# Patient Record
Sex: FEMALE | Race: OTHER | HISPANIC OR LATINO
[De-identification: names, ages, dates, MRNs, and addresses within clinical notes are randomized per-mention and may not be internally consistent; named-entity substitution may affect disease eponyms.]

---

## 2018-10-24 PROBLEM — Z00.00 ENCOUNTER FOR PREVENTIVE HEALTH EXAMINATION: Status: ACTIVE | Noted: 2018-10-24

## 2018-11-05 ENCOUNTER — APPOINTMENT (OUTPATIENT)
Dept: UROLOGY | Facility: CLINIC | Age: 48
End: 2018-11-05

## 2018-12-10 ENCOUNTER — APPOINTMENT (OUTPATIENT)
Dept: UROLOGY | Facility: CLINIC | Age: 48
End: 2018-12-10
Payer: MEDICAID

## 2018-12-10 VITALS
DIASTOLIC BLOOD PRESSURE: 86 MMHG | SYSTOLIC BLOOD PRESSURE: 135 MMHG | HEIGHT: 65 IN | BODY MASS INDEX: 21.99 KG/M2 | HEART RATE: 72 BPM | WEIGHT: 132 LBS

## 2018-12-10 LAB
BILIRUB UR QL STRIP: NORMAL
CLARITY UR: CLEAR
COLLECTION METHOD: NORMAL
GLUCOSE UR-MCNC: NORMAL
HCG UR QL: 0.2 EU/DL
HGB UR QL STRIP.AUTO: NORMAL
KETONES UR-MCNC: NORMAL
LEUKOCYTE ESTERASE UR QL STRIP: NORMAL
NITRITE UR QL STRIP: NORMAL
PH UR STRIP: 5
PROT UR STRIP-MCNC: NORMAL
SP GR UR STRIP: <=1.005

## 2018-12-10 PROCEDURE — 81003 URINALYSIS AUTO W/O SCOPE: CPT | Mod: QW

## 2018-12-10 PROCEDURE — 99204 OFFICE O/P NEW MOD 45 MIN: CPT | Mod: 25

## 2018-12-11 LAB
APPEARANCE: CLEAR
BACTERIA: ABNORMAL
BILIRUBIN URINE: NEGATIVE
BLOOD URINE: ABNORMAL
COLOR: YELLOW
GLUCOSE QUALITATIVE U: NEGATIVE MG/DL
HYALINE CASTS: 1 /LPF
KETONES URINE: NEGATIVE
LEUKOCYTE ESTERASE URINE: NEGATIVE
MICROSCOPIC-UA: NORMAL
NITRITE URINE: NEGATIVE
PH URINE: 5.5
PROTEIN URINE: NEGATIVE MG/DL
RED BLOOD CELLS URINE: 0 /HPF
SPECIFIC GRAVITY URINE: 1.01
SQUAMOUS EPITHELIAL CELLS: 2 /HPF
UROBILINOGEN URINE: NEGATIVE MG/DL
WHITE BLOOD CELLS URINE: 1 /HPF

## 2018-12-13 LAB — BACTERIA UR CULT: ABNORMAL

## 2018-12-17 ENCOUNTER — FORM ENCOUNTER (OUTPATIENT)
Age: 48
End: 2018-12-17

## 2018-12-18 ENCOUNTER — OUTPATIENT (OUTPATIENT)
Dept: OUTPATIENT SERVICES | Facility: HOSPITAL | Age: 48
LOS: 1 days | End: 2018-12-18
Payer: COMMERCIAL

## 2018-12-18 ENCOUNTER — APPOINTMENT (OUTPATIENT)
Dept: ULTRASOUND IMAGING | Facility: CLINIC | Age: 48
End: 2018-12-18
Payer: MEDICAID

## 2018-12-18 DIAGNOSIS — N12 TUBULO-INTERSTITIAL NEPHRITIS, NOT SPECIFIED AS ACUTE OR CHRONIC: ICD-10-CM

## 2018-12-18 PROCEDURE — 76770 US EXAM ABDO BACK WALL COMP: CPT

## 2018-12-18 PROCEDURE — 76770 US EXAM ABDO BACK WALL COMP: CPT | Mod: 26

## 2019-01-14 ENCOUNTER — APPOINTMENT (OUTPATIENT)
Dept: UROLOGY | Facility: CLINIC | Age: 49
End: 2019-01-14
Payer: MEDICAID

## 2019-01-14 VITALS
SYSTOLIC BLOOD PRESSURE: 132 MMHG | OXYGEN SATURATION: 99 % | DIASTOLIC BLOOD PRESSURE: 81 MMHG | HEIGHT: 65 IN | BODY MASS INDEX: 22.49 KG/M2 | WEIGHT: 135 LBS | HEART RATE: 69 BPM

## 2019-01-14 DIAGNOSIS — R30.0 DYSURIA: ICD-10-CM

## 2019-01-14 LAB
BILIRUB UR QL STRIP: NORMAL
CLARITY UR: CLEAR
COLLECTION METHOD: NORMAL
GLUCOSE UR-MCNC: NORMAL
HCG UR QL: 0.2 EU/DL
HGB UR QL STRIP.AUTO: NORMAL
KETONES UR-MCNC: NORMAL
LEUKOCYTE ESTERASE UR QL STRIP: NORMAL
NITRITE UR QL STRIP: NORMAL
PH UR STRIP: 5.5
PROT UR STRIP-MCNC: NORMAL
SP GR UR STRIP: 1.01

## 2019-01-14 PROCEDURE — 99213 OFFICE O/P EST LOW 20 MIN: CPT | Mod: 25

## 2019-01-14 PROCEDURE — 81003 URINALYSIS AUTO W/O SCOPE: CPT | Mod: QW

## 2019-01-14 NOTE — HISTORY OF PRESENT ILLNESS
[FreeTextEntry1] : 48 year old woman seen 12/10/2018 with complaint of recent episode of pyelonephritis. She was seen and treated at [facility], but she does not have any medical records with her today. This began 1 month ago. She reports continued dysuria and SP pain. \par \par 1/14/19: Patient presents for follow up. She reports her symptoms improved with abx, but returned after completion. She had been started on cipro, but UCx showed ESBL E coli resistent to quinolones. She was switched to macrobid. She reports continued frequency, urgency, dysuria, and bilateral flank pain. \par No hematuria, no hesitancy, no straining. No incontinence. \par No fevers, no chills, no nausea, no vomiting. \par \par No family history contributory to pyelonephritis.\par

## 2019-01-14 NOTE — ASSESSMENT
[FreeTextEntry1] : 49 yo F with recent pyelonephritis, s/p treatment for ESBL e coli. We discussed her sx recurrence may be return of UTI, or may be residual symptoms due to  tract irritation. Will repeat UCx and start Cefuroxime based on sensitivities. Pt aware that culture may show her only options for tx are IV abx.

## 2019-01-14 NOTE — REASON FOR VISIT
[[employer] ] : provided by [employer]   [Follow-up Visit ___] : a follow-up visit  for [unfilled] [FreeTextEntry1] : [id number] [FreeTextEntry2] : Herberto

## 2019-01-23 LAB
APPEARANCE: CLEAR
BACTERIA UR CULT: ABNORMAL
BACTERIA: NEGATIVE
BILIRUBIN URINE: NEGATIVE
BLOOD URINE: NEGATIVE
COLOR: YELLOW
GLUCOSE QUALITATIVE U: NEGATIVE MG/DL
HYALINE CASTS: 1 /LPF
KETONES URINE: NEGATIVE
LEUKOCYTE ESTERASE URINE: NEGATIVE
MICROSCOPIC-UA: NORMAL
NITRITE URINE: NEGATIVE
PH URINE: 5
PROTEIN URINE: NEGATIVE MG/DL
RED BLOOD CELLS URINE: 0 /HPF
SPECIFIC GRAVITY URINE: 1.01
SQUAMOUS EPITHELIAL CELLS: 2 /HPF
UROBILINOGEN URINE: NEGATIVE MG/DL
WHITE BLOOD CELLS URINE: 1 /HPF

## 2019-01-28 ENCOUNTER — APPOINTMENT (OUTPATIENT)
Age: 49
End: 2019-01-28
Payer: MEDICAID

## 2019-01-28 DIAGNOSIS — A49.9 URINARY TRACT INFECTION, SITE NOT SPECIFIED: ICD-10-CM

## 2019-01-28 DIAGNOSIS — N12 TUBULO-INTERSTITIAL NEPHRITIS, NOT SPECIFIED AS ACUTE OR CHRONIC: ICD-10-CM

## 2019-01-28 DIAGNOSIS — N39.0 URINARY TRACT INFECTION, SITE NOT SPECIFIED: ICD-10-CM

## 2019-01-28 PROCEDURE — 99213 OFFICE O/P EST LOW 20 MIN: CPT

## 2019-01-28 NOTE — ASSESSMENT
[FreeTextEntry1] : 49 yo F with episode of pyelonephritis and persistent UTI symptoms afterward, now with resolution on cefuroxime. Will repeat UCx for test of cure. Renal US unremarkable. This so far is an isolated episode and therefore pt does not need further work up at this time. If it recurs, pt can return for further evaluation.

## 2019-01-28 NOTE — HISTORY OF PRESENT ILLNESS
[FreeTextEntry1] : 48 year old woman seen 12/10/2018 with complaint of recent episode of pyelonephritis. She was seen and treated at [facility], but she does not have any medical records with her today. This began 1 month ago. She reports continued dysuria and SP pain. \par \par 1/14/19: Patient presents for follow up. She reports her symptoms improved with abx, but returned after completion. She had been started on cipro, but UCx showed ESBL E coli resistent to quinolones. She was switched to macrobid. She reports continued frequency, urgency, dysuria, and bilateral flank pain. \par No hematuria, no hesitancy, no straining. No incontinence. \par No fevers, no chills, no nausea, no vomiting. \par \par 1/28/19: Patient presents for follow up. Ucx sent at time of last visit grew E Coli, but sensitive to all but quinolones. She reports near resolution of her symptoms since changing abx to Cefuroime, which she has completed. Frequency, urgency, dysuria have resolved. She still reports mild bilateral flank pain, but only occasionally.  No hematuria, no hesitancy, no straining. No incontinence. \par No fevers, no chills, no nausea, no vomiting. \par \par Renal US (12/18/18): no hydronephrosis, no renal stones. 4 mm echogenic focus c/w angiomyolipoma

## 2019-01-28 NOTE — REASON FOR VISIT
[[employer] ] : provided by [employer]   [Follow-up Visit ___] : a follow-up visit  for [unfilled] [FreeTextEntry1] : [id number] [FreeTextEntry2] : Francy

## 2019-01-29 LAB
APPEARANCE: CLEAR
BACTERIA: NEGATIVE
BILIRUBIN URINE: NEGATIVE
BLOOD URINE: NEGATIVE
COLOR: YELLOW
GLUCOSE QUALITATIVE U: NEGATIVE MG/DL
HYALINE CASTS: 1 /LPF
KETONES URINE: NEGATIVE
LEUKOCYTE ESTERASE URINE: NEGATIVE
MICROSCOPIC-UA: NORMAL
NITRITE URINE: NEGATIVE
PH URINE: 5
PROTEIN URINE: NEGATIVE MG/DL
RED BLOOD CELLS URINE: 1 /HPF
SPECIFIC GRAVITY URINE: 1.02
SQUAMOUS EPITHELIAL CELLS: 2 /HPF
UROBILINOGEN URINE: NEGATIVE MG/DL
WHITE BLOOD CELLS URINE: 1 /HPF

## 2019-01-30 LAB — BACTERIA UR CULT: NORMAL

## 2020-12-17 ENCOUNTER — APPOINTMENT (OUTPATIENT)
Dept: RHEUMATOLOGY | Facility: CLINIC | Age: 50
End: 2020-12-17

## 2020-12-21 PROBLEM — N39.0 URINARY TRACT INFECTION, BACTERIAL: Status: RESOLVED | Noted: 2018-12-13 | Resolved: 2020-12-21

## 2022-02-16 ENCOUNTER — APPOINTMENT (OUTPATIENT)
Dept: RHEUMATOLOGY | Facility: CLINIC | Age: 52
End: 2022-02-16
Payer: COMMERCIAL

## 2022-02-16 ENCOUNTER — RESULT REVIEW (OUTPATIENT)
Age: 52
End: 2022-02-16

## 2022-02-16 VITALS
OXYGEN SATURATION: 99 % | HEART RATE: 99 BPM | WEIGHT: 139 LBS | BODY MASS INDEX: 23.16 KG/M2 | SYSTOLIC BLOOD PRESSURE: 157 MMHG | DIASTOLIC BLOOD PRESSURE: 69 MMHG | HEIGHT: 65 IN | TEMPERATURE: 97.2 F

## 2022-02-16 PROCEDURE — 99204 OFFICE O/P NEW MOD 45 MIN: CPT | Mod: 25

## 2022-02-16 NOTE — HISTORY OF PRESENT ILLNESS
[FreeTextEntry1] : 52 y/o female referred to rheumatology for bone pain\par Pt started to have worsening pain the neck and arm x 1 year. Pt reports pains the R>L arm and b/l feet as well.\par Denies joint swelling/redness. AM stiffness lasting ~2 mins. Pains worse with rest. Pt has not had X-rays recently.\par Pt takes Tylenol and ibuprofen PRN for the pains with mild improvement but bothers her stomach.\par Numbness/tingling of hands.\par Pt was seen by orthopedics about 3 years and told that she had some arthritis of the neck.\par Pt had PT which did not help the patient.\par \par Patient denies fatigue, fever, chills, chest pain, abdominal pain, cough, SOB, nausea, vomiting, diarrhea,  blood in stool, hematuria, rash, Raynaud's, alopecia, dry eyes, dry mouth, miscarriages, Hx of DVT/PEs.\par ROS negative unless otherwise noted above.\par \par No family Hx of autoimmune diseases.\par \par

## 2022-02-16 NOTE — ASSESSMENT
[FreeTextEntry1] : 52 y/o female referred to rheumatology for bone pain\par Pt started to have worsening pain the neck and arm x 1 year. Pt reports pains the R>L arm and b/l feet as well.\par Denies joint swelling/redness. AM stiffness lasting ~2 mins. Pains worse with rest. Pt has not had X-rays recently.\par Pt takes Tylenol and ibuprofen PRN for the pains with mild improvement but bothers her stomach.\par Numbness/tingling of hands.\par Pt was seen by orthopedics about 3 years and told that she had some arthritis of the neck.\par Pt had PT which did not help the patient.\par No family Hx of autoimmune diseases.\par \par Patient has pains of neck, R>L arm and feet that are non-inflammatory in nature without signs of synovitis. Exam shows tight paraspinal muscles near cervical spine. Patient likely has pains from osteoarthritis, compressive neuropathy of cervical spine, and muscle spasm of paraspinal muscles.\par No signs or symptoms concerning for underlying autoimmune disease\par \par - Prescribed naproxen 500mg PO BID PRN pain. I advisedthat the NSAID should be taken with food.  In addition while taking the prescribed NSAID, no over the counter or other NSAIDs should be used, such as ibuprofen (Motrin or Advil) or naproxen (Aleve) as this can cause stomach upset or other side effects.  If needed forbreakthrough pain Tylenol can be used.\par - Obtain labwork to screen for signs of autoimmune inflammatory arthritis\par - Obtain XR of C-spine, R shoulder, b/l feet to evaluate for arthritis\par - Will consider advanced imaging of C-spine, orthopedics, neuro referral based on above results\par - RTC 1 month for follow up\par

## 2022-02-17 LAB
ALBUMIN SERPL ELPH-MCNC: 4.7 G/DL
ALP BLD-CCNC: 107 U/L
ALT SERPL-CCNC: 17 U/L
ANION GAP SERPL CALC-SCNC: 13 MMOL/L
AST SERPL-CCNC: 21 U/L
BASOPHILS # BLD AUTO: 0.02 K/UL
BASOPHILS NFR BLD AUTO: 0.6 %
BILIRUB SERPL-MCNC: 0.3 MG/DL
BUN SERPL-MCNC: 10 MG/DL
CALCIUM SERPL-MCNC: 10 MG/DL
CHLORIDE SERPL-SCNC: 105 MMOL/L
CO2 SERPL-SCNC: 25 MMOL/L
CREAT SERPL-MCNC: 0.68 MG/DL
CRP SERPL-MCNC: <3 MG/L
EOSINOPHIL # BLD AUTO: 0.04 K/UL
EOSINOPHIL NFR BLD AUTO: 1.2 %
ERYTHROCYTE [SEDIMENTATION RATE] IN BLOOD BY WESTERGREN METHOD: 24 MM/HR
GLUCOSE SERPL-MCNC: 122 MG/DL
HCT VFR BLD CALC: 40.2 %
HGB BLD-MCNC: 13.1 G/DL
IMM GRANULOCYTES NFR BLD AUTO: 0 %
LYMPHOCYTES # BLD AUTO: 1.4 K/UL
LYMPHOCYTES NFR BLD AUTO: 41.8 %
MAN DIFF?: NORMAL
MCHC RBC-ENTMCNC: 30.5 PG
MCHC RBC-ENTMCNC: 32.6 GM/DL
MCV RBC AUTO: 93.5 FL
MONOCYTES # BLD AUTO: 0.24 K/UL
MONOCYTES NFR BLD AUTO: 7.2 %
NEUTROPHILS # BLD AUTO: 1.65 K/UL
NEUTROPHILS NFR BLD AUTO: 49.2 %
PLATELET # BLD AUTO: 208 K/UL
POTASSIUM SERPL-SCNC: 3.6 MMOL/L
PROT SERPL-MCNC: 7.3 G/DL
RBC # BLD: 4.3 M/UL
RBC # FLD: 13.9 %
RHEUMATOID FACT SER QL: <10 IU/ML
SODIUM SERPL-SCNC: 144 MMOL/L
WBC # FLD AUTO: 3.35 K/UL

## 2022-02-19 LAB
ANA PAT FLD IF-IMP: ABNORMAL
ANA SER IF-ACNC: ABNORMAL
CCP AB SER IA-ACNC: <8 UNITS
RF+CCP IGG SER-IMP: NEGATIVE

## 2022-02-26 ENCOUNTER — APPOINTMENT (OUTPATIENT)
Dept: RADIOLOGY | Facility: CLINIC | Age: 52
End: 2022-02-26
Payer: COMMERCIAL

## 2022-02-26 ENCOUNTER — OUTPATIENT (OUTPATIENT)
Dept: OUTPATIENT SERVICES | Facility: HOSPITAL | Age: 52
LOS: 1 days | End: 2022-02-26
Payer: COMMERCIAL

## 2022-02-26 DIAGNOSIS — M54.2 CERVICALGIA: ICD-10-CM

## 2022-02-26 PROCEDURE — 73030 X-RAY EXAM OF SHOULDER: CPT | Mod: 26,RT

## 2022-02-26 PROCEDURE — 73620 X-RAY EXAM OF FOOT: CPT

## 2022-02-26 PROCEDURE — 72040 X-RAY EXAM NECK SPINE 2-3 VW: CPT | Mod: 26

## 2022-02-26 PROCEDURE — 73030 X-RAY EXAM OF SHOULDER: CPT

## 2022-02-26 PROCEDURE — 73620 X-RAY EXAM OF FOOT: CPT | Mod: 26,LT,RT

## 2022-02-26 PROCEDURE — 72040 X-RAY EXAM NECK SPINE 2-3 VW: CPT

## 2022-03-16 ENCOUNTER — APPOINTMENT (OUTPATIENT)
Dept: RHEUMATOLOGY | Facility: CLINIC | Age: 52
End: 2022-03-16
Payer: COMMERCIAL

## 2022-03-16 VITALS
WEIGHT: 141 LBS | HEART RATE: 83 BPM | SYSTOLIC BLOOD PRESSURE: 149 MMHG | OXYGEN SATURATION: 98 % | DIASTOLIC BLOOD PRESSURE: 90 MMHG | BODY MASS INDEX: 23.49 KG/M2 | HEIGHT: 65 IN | TEMPERATURE: 97.1 F

## 2022-03-16 PROCEDURE — 99214 OFFICE O/P EST MOD 30 MIN: CPT | Mod: 25

## 2022-03-16 NOTE — HISTORY OF PRESENT ILLNESS
[FreeTextEntry1] : HISTORY: \par 52 y/o female presents as follow up for bone pain \par Pt started to have worsening pain the neck and arm x 1 year. Pt reports pains the R>L arm and b/l feet as well. \par Denies joint swelling/redness. AM stiffness lasting ~2 mins. Pains worse with rest. Pt has not had X-rays recently. \par Pt takes Tylenol and ibuprofen PRN for the pains with mild improvement but bothers her stomach. \par Numbness/tingling of hands. \par Pt was seen by orthopedics about 3 years and told that she had some arthritis of the neck. \par Pt had PT which did not help the patient. \par No family Hx of autoimmune diseases. \par \par INTERVAL HISTORY: \par Pt states that her pains in her neck, arms, feet have not changed since last visit.\par Pt states that naproxen helps but make her dizzy so cannot take it all the time, however would like to continue PRN for now.\par \par WORKUP: \par Remarkable for (2/2022): JENNA 1:160 speckled, ESR 24, WBC 3.35 \par Normal/neg (2/2022): CBC (except WBC), CMP, CRP, RF, CCP \par XR R shoulder (2.2022): Normal \par XR b/l feet (2/2022): Normal \par XR C-spine (2/2022): Normal\par

## 2022-03-16 NOTE — ASSESSMENT
[FreeTextEntry1] : 50 y/o female presents as follow up for bone pain \par Pt started to have worsening pain the neck and arm x 1 year. Pt reports pains the R>L arm and b/l feet as well. \par Denies joint swelling/redness. AM stiffness lasting ~2 mins. Pains worse with rest. Pt has not had X-rays recently. \par Pt takes Tylenol and ibuprofen PRN for the pains with mild improvement but bothers her stomach. \par Numbness/tingling of hands. \par Pt was seen by orthopedics about 3 years and told that she had some arthritis of the neck. \par Pt had PT which did not help the patient. \par No family Hx of autoimmune diseases. \par \par Patient has pains of neck, R>L arm and feet that are non-inflammatory in nature without signs of synovitis. Exam shows tight paraspinal muscles near cervical spine. Patient likely has pains from osteoarthritis, compressive neuropathy of cervical spine, and muscle spasm of paraspinal muscles. \par Workup by me with positive JENNA, mild low WBC. Otherwise negative RA markers and inflammatory markers. XRs normal.\par \par - Obtain labwork to evaluate positive JENNA, although low suspicion for underlying autoimmune disease\par - Refer to neurology for possible NCV of b/l upper and lower extremities for her symptoms which may be neuropathic.\par - c/w naproxen 500mg PO BID PRN. Pt will call if she would like to try another NSAID. I advised that the NSAID should be taken with food. In addition while taking the prescribed NSAID, no over the counter or other NSAIDs should be used, such as ibuprofen (Motrin or Advil) or naproxen (Aleve) as this can cause stomach upset or other side effects. If needed for breakthrough pain Tylenol can be used.  \par - RTC PRN\par \par

## 2022-03-16 NOTE — REASON FOR VISIT
[Follow-Up: _____] : a [unfilled] follow-up visit [Interpreters_IDNumber] : [id number] [Interpreters_FullName] : Dionisio

## 2022-03-17 LAB
25(OH)D3 SERPL-MCNC: 10.2 NG/ML
C3 SERPL-MCNC: 118 MG/DL
C4 SERPL-MCNC: 23 MG/DL
CK SERPL-CCNC: 227 U/L
CREAT SPEC-SCNC: 118 MG/DL
CREAT/PROT UR: 0.1 RATIO
PROT UR-MCNC: 13 MG/DL
THYROGLOB AB SERPL-ACNC: <20 IU/ML
THYROPEROXIDASE AB SERPL IA-ACNC: <10 IU/ML
TSH SERPL-ACNC: 2.95 UIU/ML

## 2022-03-18 LAB
ALDOLASE SERPL-CCNC: 3.7 U/L
APPEARANCE: CLEAR
BILIRUBIN URINE: NEGATIVE
BLOOD URINE: NEGATIVE
COLOR: YELLOW
DSDNA AB SER-ACNC: 12 IU/ML
ENA RNP AB SER IA-ACNC: <0.2 AL
ENA SM AB SER IA-ACNC: <0.2 AL
ENA SS-A AB SER IA-ACNC: <0.2 AL
ENA SS-B AB SER IA-ACNC: <0.2 AL
GLUCOSE QUALITATIVE U: NEGATIVE
KETONES URINE: NEGATIVE
LEUKOCYTE ESTERASE URINE: NEGATIVE
NITRITE URINE: NEGATIVE
PH URINE: 7.5
PROTEIN URINE: NEGATIVE
SPECIFIC GRAVITY URINE: 1.02
UROBILINOGEN URINE: NORMAL

## 2022-04-27 ENCOUNTER — APPOINTMENT (OUTPATIENT)
Dept: RHEUMATOLOGY | Facility: CLINIC | Age: 52
End: 2022-04-27
Payer: COMMERCIAL

## 2022-04-27 VITALS
RESPIRATION RATE: 18 BRPM | OXYGEN SATURATION: 100 % | DIASTOLIC BLOOD PRESSURE: 87 MMHG | WEIGHT: 142 LBS | HEART RATE: 73 BPM | SYSTOLIC BLOOD PRESSURE: 153 MMHG | HEIGHT: 65 IN | BODY MASS INDEX: 23.66 KG/M2

## 2022-04-27 DIAGNOSIS — R20.2 PARESTHESIA OF SKIN: ICD-10-CM

## 2022-04-27 PROCEDURE — 99214 OFFICE O/P EST MOD 30 MIN: CPT

## 2022-04-27 NOTE — ASSESSMENT
[FreeTextEntry1] : 52 y/o female presents as follow up for bone pain. \par Pt started to have worsening pain the neck and arm x 1 year. Pt reports pains the R>L arm and b/l feet as well. \par Denies joint swelling/redness. AM stiffness lasting ~2 mins. Pains worse with rest. Pt takes Tylenol and ibuprofen PRN for the pains with mild improvement but bothers her stomach. Numbness/tingling of hands. Pt was seen by orthopedics about 3 years and told that she had some arthritis of the neck. \par Pt tried PT which did not help the patient. \par No family Hx of autoimmune diseases.  \par \par Patient has pains of neck, R>L arm and feet that are non-inflammatory in nature without signs of synovitis. Exam shows tight paraspinal muscles near cervical spine. Patient likely has pains from osteoarthritis, compressive neuropathy of cervical spine, and muscle spasm of paraspinal muscles.  \par Workup by me with positive JENNA and mild low WBC but negative JENNA subserologies. Patient without inflammatory joint pains, rashes, Raynaud's, or other signs of SLE. I do not believe patient has SLE. Very low vitamin D. XRs normal. \par \par - Pending follow up with neurology for possible NCV of b/l upper and lower extremities for her symptoms which may be neuropathic.\par - Start gabapentin 100mg PO QHS, then if well tolerated can increase up to TID PRN for pains\par - Start Vit D 50,000 IU supplementation once weekly x 8 weeks. Pt advised to repeat Vit D level afterwards for further supplementation.\par - c/w naproxen 500mg PO BID PRN. Pt will call if she would like to try another NSAID. I advised that the NSAID should be taken with food. In addition while taking the prescribed NSAID, no over the counter or other NSAIDs should be used, such as ibuprofen (Motrin or Advil) or naproxen (Aleve) as this can cause stomach upset or other side effects. If needed for breakthrough pain Tylenol can be used.  \par - RTC PRN\par

## 2022-04-27 NOTE — HISTORY OF PRESENT ILLNESS
[FreeTextEntry1] : HISTORY: \par 52 y/o female presents as follow up for bone pain. \par Pt started to have worsening pain the neck and arm x 1 year. Pt reports pains the R>L arm and b/l feet as well. \par Denies joint swelling/redness. AM stiffness lasting ~2 mins. Pains worse with rest. Pt takes Tylenol and ibuprofen PRN for the pains with mild improvement but bothers her stomach. Numbness/tingling of hands. Pt was seen by orthopedics about 3 years and told that she had some arthritis of the neck. \par Pt tried PT which did not help the patient. \par No family Hx of autoimmune diseases.  \par \par INTERVAL HISTORY: \par Pt reports that she continues to have the same pains in the neck, arms, feet.\par Pt has not been seen by neurology yet, pt is trying to find one with PCP that is covered by her insurance.\par Pt is still on naproxen 500mg PRN occasionally which helps with pain but gives her some dizziness so only takes it once in a while. Denies other systemic symptoms or new symptoms.\par \par WORKUP:  \par Remarkable for (2/2022 - 3/2022): JENNA 1:160 speckled, ESR 24, WBC 3.35, , Vit D 10.2 \par Normal/neg (2/2022 - 3/2022): CBC (except WBC), CMP, U/A, UPCR, CRP, dsDNA, SSA, SSB, French, RNP, C3, C4, Thyroid Ab, RF, CCP, aldolase, TSH \par XR R shoulder (2.2022): Normal  \par XR b/l feet (2/2022): Normal  \par XR C-spine (2/2022): Normal\par

## 2022-08-05 ENCOUNTER — APPOINTMENT (OUTPATIENT)
Dept: NEUROLOGY | Facility: CLINIC | Age: 52
End: 2022-08-05

## 2022-08-05 VITALS — BODY MASS INDEX: 22.33 KG/M2 | HEIGHT: 65 IN | WEIGHT: 134 LBS

## 2022-08-05 VITALS — SYSTOLIC BLOOD PRESSURE: 118 MMHG | DIASTOLIC BLOOD PRESSURE: 80 MMHG

## 2022-08-05 DIAGNOSIS — Z86.79 PERSONAL HISTORY OF OTHER DISEASES OF THE CIRCULATORY SYSTEM: ICD-10-CM

## 2022-08-05 PROCEDURE — 99204 OFFICE O/P NEW MOD 45 MIN: CPT

## 2022-08-05 RX ORDER — LOSARTAN POTASSIUM 25 MG/1
25 TABLET, FILM COATED ORAL
Qty: 90 | Refills: 0 | Status: ACTIVE | COMMUNITY
Start: 2022-07-09

## 2022-08-05 RX ORDER — CIPROFLOXACIN HYDROCHLORIDE 500 MG/1
500 TABLET, FILM COATED ORAL TWICE DAILY
Qty: 14 | Refills: 0 | Status: COMPLETED | COMMUNITY
Start: 2018-12-10 | End: 2022-08-05

## 2022-08-05 RX ORDER — NITROFURANTOIN (MONOHYDRATE/MACROCRYSTALS) 25; 75 MG/1; MG/1
100 CAPSULE ORAL
Qty: 14 | Refills: 0 | Status: COMPLETED | COMMUNITY
Start: 2018-12-13 | End: 2022-08-05

## 2022-08-05 RX ORDER — CEFUROXIME AXETIL 500 MG/1
500 TABLET ORAL
Qty: 14 | Refills: 0 | Status: COMPLETED | COMMUNITY
Start: 2019-01-14 | End: 2022-08-05

## 2022-08-05 NOTE — ASSESSMENT
[FreeTextEntry1] : This is a 52-year-old woman with diffuse chronic pain since thomas Covid-19 in 2020.\par \par She has some mild neuropathic symptoms in the fingertips.\par This may represent carpal tunnel syndrome.\par I will obtain EMG and nerve conduction studies of the upper and lower extremities to rule out any neuropathy secondary to the Covid-19.\par \par If this is negative then she will need to continue follow-up with the rheumatologist.\par \par I will see her back after the testing.

## 2022-08-05 NOTE — PHYSICAL EXAM
[General Appearance - Alert] : alert [General Appearance - In No Acute Distress] : in no acute distress [Oriented To Time, Place, And Person] : oriented to person, place, and time [Affect] : the affect was normal [Memory Recent] : recent memory was not impaired [Memory Remote] : remote memory was not impaired [Cranial Nerves Optic (II)] : visual acuity intact bilaterally,  visual fields full to confrontation, pupils equal round and reactive to light [Cranial Nerves Oculomotor (III)] : extraocular motion intact [Cranial Nerves Trigeminal (V)] : facial sensation intact symmetrically [Cranial Nerves Facial (VII)] : face symmetrical [Cranial Nerves Vestibulocochlear (VIII)] : hearing was intact bilaterally [Cranial Nerves Glossopharyngeal (IX)] : tongue and palate midline [Cranial Nerves Accessory (XI - Cranial And Spinal)] : head turning and shoulder shrug symmetric [Cranial Nerves Hypoglossal (XII)] : there was no tongue deviation with protrusion [Motor Tone] : muscle tone was normal in all four extremities [Motor Strength] : muscle strength was normal in all four extremities [Sensation Tactile Decrease] : light touch was intact [Sensation Pain / Temperature Decrease] : pain and temperature was intact [Sensation Vibration Decrease] : vibration was intact [Abnormal Walk] : normal gait [2+] : Patella left 2+ [1+] : Ankle jerk left 1+ [Optic Disc Abnormality] : the optic disc were normal in size and color [Dysarthria] : no dysarthria [Aphasia] : no dysphasia/aphasia [Romberg's Sign] : Romberg's sign was negtive [Coordination - Dysmetria Impaired Finger-to-Nose Bilateral] : not present [Plantar Reflex Right Only] : normal on the right [Plantar Reflex Left Only] : normal on the left

## 2022-08-05 NOTE — HISTORY OF PRESENT ILLNESS
Quality 130: Documentation Of Current Medications In The Medical Record: Current Medications Documented Detail Level: Detailed [FreeTextEntry1] : I saw this patient in the office today.\par \par She reports that she contracted Covid-19 early in the pandemic although did not require hospitalization.\par Ever since then she has had pains throughout her body.\par She has been following with the rheumatologist.  She describes some numbness and tingling in the fingertips but otherwise no sensory loss or motor deficits.

## 2022-10-13 ENCOUNTER — APPOINTMENT (OUTPATIENT)
Dept: NEUROLOGY | Facility: CLINIC | Age: 52
End: 2022-10-13

## 2022-10-18 ENCOUNTER — APPOINTMENT (OUTPATIENT)
Dept: NEUROLOGY | Facility: CLINIC | Age: 52
End: 2022-10-18

## 2022-10-18 PROCEDURE — 95886 MUSC TEST DONE W/N TEST COMP: CPT

## 2022-10-18 PROCEDURE — 95911 NRV CNDJ TEST 9-10 STUDIES: CPT

## 2022-12-22 ENCOUNTER — APPOINTMENT (OUTPATIENT)
Dept: NEUROLOGY | Facility: CLINIC | Age: 52
End: 2022-12-22

## 2023-02-27 ENCOUNTER — APPOINTMENT (OUTPATIENT)
Dept: RHEUMATOLOGY | Facility: CLINIC | Age: 53
End: 2023-02-27
Payer: COMMERCIAL

## 2023-02-27 VITALS
DIASTOLIC BLOOD PRESSURE: 84 MMHG | RESPIRATION RATE: 14 BRPM | OXYGEN SATURATION: 100 % | HEART RATE: 80 BPM | HEIGHT: 65 IN | SYSTOLIC BLOOD PRESSURE: 146 MMHG | WEIGHT: 140 LBS | TEMPERATURE: 98 F | BODY MASS INDEX: 23.32 KG/M2

## 2023-02-27 DIAGNOSIS — E55.9 VITAMIN D DEFICIENCY, UNSPECIFIED: ICD-10-CM

## 2023-02-27 PROCEDURE — 99214 OFFICE O/P EST MOD 30 MIN: CPT | Mod: 25

## 2023-02-27 NOTE — HISTORY OF PRESENT ILLNESS
[FreeTextEntry1] : HISTORY: \par 53 y/o female presents as follow up for joint pains. \par Pt started to have worsening pain the neck and arm x 1 year. Pt reports pains the R>L arm and b/l feet as well.  \par Denies joint swelling/redness. AM stiffness lasting ~2 mins. Pains worse with rest. Pt takes Tylenol and ibuprofen PRN for the pains with mild improvement but bothers her stomach. Numbness/tingling of hands. Pt was seen by orthopedics about 3 years and told that she had some arthritis of the neck.  \par Pt tried PT which did not help the patient.  \par No family Hx of autoimmune diseases.  \par \par Workup by me with positive JENNA and mild low WBC but negative JENNA subserologies. Patient without inflammatory joint pains, rashes, Raynaud's, or other signs of SLE. I do not believe patient has SLE. Very low vitamin D. XRs normal. Patient likely has pains from osteoarthritis, compressive neuropathy of cervical spine, and muscle spasm of paraspinal muscles.  \par \par INTERVAL HISTORY: \par Pt was last seen in 4/2022. Pt had EMG/NCV of b/l UE and LE were normal. \par Pt reports gabapentin 100mg BID helps a bit with the pain but states she is having more pains, described as burning, in the R 4th and 5th toes. Pt is worried about vascular issues. Pains are worst in the AM.\par Pt continues to use naproxen PRN.\par \par WORKUP:  \par Remarkable for (2/2022 - 3/2022): JENNA 1:160 speckled, ESR 24, WBC 3.35, , Vit D 10.2  \par Normal/neg (2/2022 - 3/2022): CBC (except WBC), CMP, U/A, UPCR, CRP, dsDNA, SSA, SSB, French, RNP, C3, C4, Thyroid Ab, RF, CCP, aldolase, TSH  \par XR R shoulder (2.2022): Normal  \par XR b/l feet (2/2022): Normal  \par XR C-spine (2/2022): Normal\par

## 2023-02-27 NOTE — ASSESSMENT
[FreeTextEntry1] : 51 y/o female presents as follow up for joint pains. \par Pt started to have worsening pain the neck and arm x 1 year. Pt reports pains the R>L arm and b/l feet as well.  \par Denies joint swelling/redness. AM stiffness lasting ~2 mins. Pains worse with rest. Pt takes Tylenol and ibuprofen PRN for the pains with mild improvement but bothers her stomach. Numbness/tingling of hands. Pt was seen by orthopedics about 3 years and told that she had some arthritis of the neck.  \par Pt tried PT which did not help the patient.  \par No family Hx of autoimmune diseases.  \par \par Workup by me with positive JENNA and mild low WBC but negative JENNA subserologies. Patient without inflammatory joint pains, rashes, Raynaud's, or other signs of SLE. I do not believe patient has SLE. Very low vitamin D. XRs normal. Patient likely has pains from osteoarthritis, compressive neuropathy of cervical spine, and muscle spasm of paraspinal muscles.  \par \par Pt returns after negative EMG/NCV by neurology with especially worsened pain in b/l feet worst in R 4th and 5th toes. Pt's symptoms continue to sound neuropathic, but pt also concerned about vascular abnormality.\par \par - Will repeat some labwork to evaluate abnormal labwork seen on last labwork including WBC, Vit D\par - Increase gabapentin 100mg BID -> 100mg QAM, 300mg QPM. Advised to watch for somnolence \par - Refer to vascular for evaluation for vascular causes of pt's feet pain\par - Refer to podiatry for evaluation for other workup and intervention for her feet pain.\par - c/w naproxen 500mg PO BID PRN. Pt will call if she would like to try another NSAID. I advised that the NSAID should be taken with food. In addition while taking the prescribed NSAID, no over the counter or other NSAIDs should be used, such as ibuprofen (Motrin or Advil) or naproxen (Aleve) as this can cause stomach upset or other side effects. If needed for breakthrough pain Tylenol can be used.  \par - RTC 2 months for follow up\par \par

## 2023-02-27 NOTE — REASON FOR VISIT
[Follow-Up: _____] : a [unfilled] follow-up visit [[employer] ] : provided by [employer]   [Interpreters_IDNumber] : [id number] [Interpreters_FullName] : Melissa [TWNoteComboBox1] : [place]cu[place]

## 2023-02-28 LAB
25(OH)D3 SERPL-MCNC: 17.5 NG/ML
ALBUMIN SERPL ELPH-MCNC: 4.6 G/DL
ALP BLD-CCNC: 114 U/L
ALT SERPL-CCNC: 16 U/L
ANION GAP SERPL CALC-SCNC: 11 MMOL/L
AST SERPL-CCNC: 21 U/L
BASOPHILS # BLD AUTO: 0.02 K/UL
BASOPHILS NFR BLD AUTO: 0.6 %
BILIRUB SERPL-MCNC: 0.3 MG/DL
BUN SERPL-MCNC: 12 MG/DL
C3 SERPL-MCNC: 105 MG/DL
C4 SERPL-MCNC: 20 MG/DL
CALCIUM SERPL-MCNC: 10 MG/DL
CHLORIDE SERPL-SCNC: 105 MMOL/L
CO2 SERPL-SCNC: 26 MMOL/L
CREAT SERPL-MCNC: 0.68 MG/DL
CRP SERPL-MCNC: <3 MG/L
DSDNA AB SER-ACNC: 97 IU/ML
EGFR: 105 ML/MIN/1.73M2
EOSINOPHIL # BLD AUTO: 0.07 K/UL
EOSINOPHIL NFR BLD AUTO: 2.1 %
ERYTHROCYTE [SEDIMENTATION RATE] IN BLOOD BY WESTERGREN METHOD: 12 MM/HR
GLUCOSE SERPL-MCNC: 92 MG/DL
HCT VFR BLD CALC: 40.5 %
HGB BLD-MCNC: 13.3 G/DL
IMM GRANULOCYTES NFR BLD AUTO: 0.3 %
LYMPHOCYTES # BLD AUTO: 1.3 K/UL
LYMPHOCYTES NFR BLD AUTO: 39 %
MAN DIFF?: NORMAL
MCHC RBC-ENTMCNC: 30.8 PG
MCHC RBC-ENTMCNC: 32.8 GM/DL
MCV RBC AUTO: 93.8 FL
MONOCYTES # BLD AUTO: 0.23 K/UL
MONOCYTES NFR BLD AUTO: 6.9 %
NEUTROPHILS # BLD AUTO: 1.7 K/UL
NEUTROPHILS NFR BLD AUTO: 51.1 %
PLATELET # BLD AUTO: 194 K/UL
POTASSIUM SERPL-SCNC: 4.3 MMOL/L
PROT SERPL-MCNC: 6.9 G/DL
RBC # BLD: 4.32 M/UL
RBC # FLD: 13.3 %
SODIUM SERPL-SCNC: 142 MMOL/L
WBC # FLD AUTO: 3.33 K/UL

## 2023-04-26 ENCOUNTER — APPOINTMENT (OUTPATIENT)
Dept: RHEUMATOLOGY | Facility: CLINIC | Age: 53
End: 2023-04-26
Payer: COMMERCIAL

## 2023-04-26 VITALS
SYSTOLIC BLOOD PRESSURE: 145 MMHG | DIASTOLIC BLOOD PRESSURE: 88 MMHG | HEIGHT: 64 IN | OXYGEN SATURATION: 100 % | RESPIRATION RATE: 16 BRPM | HEART RATE: 75 BPM

## 2023-04-26 PROCEDURE — 99214 OFFICE O/P EST MOD 30 MIN: CPT

## 2023-04-26 RX ORDER — ERGOCALCIFEROL 1.25 MG/1
1.25 MG CAPSULE, LIQUID FILLED ORAL
Qty: 8 | Refills: 0 | Status: COMPLETED | COMMUNITY
Start: 2022-04-27 | End: 2023-04-26

## 2023-04-26 RX ORDER — NAPROXEN 500 MG/1
500 TABLET ORAL
Qty: 60 | Refills: 2 | Status: COMPLETED | COMMUNITY
Start: 2022-02-16 | End: 2023-04-26

## 2023-04-26 NOTE — ASSESSMENT
[FreeTextEntry1] : 53 y/o female presents as follow up for joint pains.  \par Pt started to have worsening pain the neck and arm x 1 year. Pt reports pains the R>L arm and b/l feet as well.  \par Denies joint swelling/redness. AM stiffness lasting ~2 mins. Pains worse with rest. Pt takes Tylenol and ibuprofen PRN for the pains with mild improvement but bothers her stomach. Numbness/tingling of hands. Pt was seen by orthopedics about 3 years and told that she had some arthritis of the neck.  \par Pt tried PT which did not help the patient.  \par No family Hx of autoimmune diseases.  \par \par Workup by me with positive JENNA and mild low WBC but negative JENNA subserologies. Patient without inflammatory joint pains, rashes, Raynaud's, or other signs of SLE. Very low vitamin D. XRs normal.  \par \par Pt returns after negative EMG/NCV by neurology with especially worsened pain in b/l feet worst in R 4th and 5th toes. Pt's symptoms continue to sound neuropathic, but pt also concerned about vascular abnormality. \par Repeat labwork by me with dsDNA positive. Given combination of JENNA, dsDNA, leukopenia, pt was started on HCQ by me on 2/2023 for possible SLE.\par \par - Labwork done 2 months ago, will defer labwork today.\par -  c/w HCQ 200mg PO BID. HCQ may take longer time to take effect. Will refer to ophthalmology if we decide to continue medication for a longer time\par - Rx PDN 20mg x 7 days for temporary symptomatic relief.\par - Increase gabapentin 100mg BID -> 100mg QAM, 300mg QPM. Advised to watch for somnolence  \par - RTC 2 months for follow up \par

## 2023-04-26 NOTE — REASON FOR VISIT
[Follow-Up: _____] : a [unfilled] follow-up visit [[employer] ] : provided by [employer]   [Interpreters_IDNumber] : [id number] [Interpreters_FullName] : Stephanie [TWNoteComboBox1] : Cypriot

## 2023-04-26 NOTE — HISTORY OF PRESENT ILLNESS
[FreeTextEntry1] : HISTORY: \par 51 y/o female presents as follow up for joint pains.  \par Pt started to have worsening pain the neck and arm x 1 year. Pt reports pains the R>L arm and b/l feet as well.  \par Denies joint swelling/redness. AM stiffness lasting ~2 mins. Pains worse with rest. Pt takes Tylenol and ibuprofen PRN for the pains with mild improvement but bothers her stomach. Numbness/tingling of hands. Pt was seen by orthopedics about 3 years and told that she had some arthritis of the neck.  \par Pt tried PT which did not help the patient.  \par No family Hx of autoimmune diseases.  \par \par Workup by me with positive JENNA and mild low WBC but negative JENNA subserologies. Patient without inflammatory joint pains, rashes, Raynaud's, or other signs of SLE. Very low vitamin D. XRs normal.  \par \par Pt returns after negative EMG/NCV by neurology with especially worsened pain in b/l feet worst in R 4th and 5th toes. Pt's symptoms continue to sound neuropathic, but pt also concerned about vascular abnormality. \par \par INTERVAL HISTORY: \par Repeat labwork by me with dsDNA positive. Given combination of JENNA, dsDNA, leukopenia, pt was started on HCQ by me on 2/2023 for possible SLE. Pt has been on HCQ for about 2 months and does not feel any improvement in her pains. Pt reports more pains in her elbows and hands now. Pt has been continuing on gabapentin 100mg BID.\par \par WORKUP:  \par Remarkable for (2/2022 - 2/2023): JENNA 1:160 speckled, dsDNA neg -> 97, ESR 24, WBC 3.35->3.33, , Vit D 10.2 -> 17.5 \par Normal/neg (2/2022 - 2/2023): CBC (except WBC), CMP, U/A, UPCR, CRP, SSA, SSB, French, RNP, C3, C4, Thyroid Ab, RF, CCP, aldolase, TSH  \par XR R shoulder (2.2022): Normal  \par XR b/l feet (2/2022): Normal  \par XR C-spine (2/2022): Normal\par

## 2023-06-07 ENCOUNTER — OFFICE (OUTPATIENT)
Dept: URBAN - METROPOLITAN AREA CLINIC 94 | Facility: CLINIC | Age: 53
Setting detail: OPHTHALMOLOGY
End: 2023-06-07
Payer: COMMERCIAL

## 2023-06-07 DIAGNOSIS — H40.033: ICD-10-CM

## 2023-06-07 DIAGNOSIS — H25.11: ICD-10-CM

## 2023-06-07 DIAGNOSIS — H25.13: ICD-10-CM

## 2023-06-07 DIAGNOSIS — H04.123: ICD-10-CM

## 2023-06-07 PROBLEM — H25.12 CATARACT; RIGHT EYE, LEFT EYE, BOTH EYES: Status: ACTIVE | Noted: 2023-06-07

## 2023-06-07 PROCEDURE — 92083 EXTENDED VISUAL FIELD XM: CPT | Performed by: OPHTHALMOLOGY

## 2023-06-07 PROCEDURE — 92020 GONIOSCOPY: CPT | Performed by: OPHTHALMOLOGY

## 2023-06-07 PROCEDURE — 92136 OPHTHALMIC BIOMETRY: CPT | Performed by: OPHTHALMOLOGY

## 2023-06-07 PROCEDURE — 92133 CPTRZD OPH DX IMG PST SGM ON: CPT | Performed by: OPHTHALMOLOGY

## 2023-06-07 PROCEDURE — 99214 OFFICE O/P EST MOD 30 MIN: CPT | Performed by: OPHTHALMOLOGY

## 2023-06-07 ASSESSMENT — SPHEQUIV_DERIVED
OS_SPHEQUIV: 1.25
OD_SPHEQUIV: 1.25
OS_SPHEQUIV: 1.375
OS_SPHEQUIV: 1.375
OD_SPHEQUIV: 1.25
OD_SPHEQUIV: 1

## 2023-06-07 ASSESSMENT — REFRACTION_AUTOREFRACTION
OS_AXIS: 081
OS_SPHERE: +1.75
OD_AXIS: 079
OD_SPHERE: +1.50
OD_CYLINDER: -0.50
OS_CYLINDER: -0.75

## 2023-06-07 ASSESSMENT — TONOMETRY
OD_IOP_MMHG: 12
OS_IOP_MMHG: 12

## 2023-06-07 ASSESSMENT — REFRACTION_MANIFEST
OD_SPHERE: +1.50
OS_CYLINDER: -0.50
OS_CYLINDER: -0.75
OS_AXIS: 081
OD_VA1: 20/20
OS_AXIS: 085
OS_ADD: +1.50
OD_SPHERE: +1.25
OD_CYLINDER: -0.50
OS_SPHERE: +1.50
OD_VA1: 20/60
OS_SPHERE: +1.75
OD_AXIS: 090
OD_VA2: 20/20(J1+)
OS_VA1: 20/60
OS_VA1: 20/25
OU_VA: 20/20
OD_ADD: +1.50
OS_VA2: 20/20(J1+)
OD_AXIS: 079
OD_CYLINDER: -0.50

## 2023-06-07 ASSESSMENT — REFRACTION_CURRENTRX
OD_OVR_VA: 20/
OD_CYLINDER: -0.50
OS_SPHERE: +1.50
OS_OVR_VA: 20/
OS_SPHERE: +3.00
OS_OVR_VA: 20/
OS_VPRISM_DIRECTION: SV
OS_CYLINDER: -0.50
OD_VPRISM_DIRECTION: SV
OD_VPRISM_DIRECTION: SV
OD_OVR_VA: 20/
OD_SPHERE: +1.50
OD_SPHERE: +2.75
OD_AXIS: 086
OS_AXIS: 090
OS_VPRISM_DIRECTION: SV

## 2023-06-07 ASSESSMENT — KERATOMETRY
OS_K1POWER_DIOPTERS: 44.75
OD_AXISANGLE_DEGREES: 169
OS_K1K2_AVERAGE: 45
OD_K1K2_AVERAGE: 45
OD_K2POWER_DIOPTERS: 45.50
OS_AXISANGLE_DEGREES: 102
OS_AXISANGLE_DEGREES: 12
OD_CYLAXISANGLE_DEGREES: 079
OS_AXISANGLE2_DEGREES: 102
OS_K2POWER_DIOPTERS: 45.25
OD_K1POWER_DIOPTERS: 44.50
OS_K1POWER_DIOPTERS: 44.75
OD_CYLPOWER_DEGREES: 1
OD_K2POWER_DIOPTERS: 45.50
OD_K1POWER_DIOPTERS: 44.50
OS_K2POWER_DIOPTERS: 45.25
OD_AXISANGLE_DEGREES: 079
OS_CYLPOWER_DEGREES: 0.5
OD_AXISANGLE2_DEGREES: 079
OS_CYLAXISANGLE_DEGREES: 102

## 2023-06-07 ASSESSMENT — VISUAL ACUITY
OD_BCVA: 20/60
OS_BCVA: 20/60-1

## 2023-06-07 ASSESSMENT — AXIALLENGTH_DERIVED
OS_AL: 22.5977
OD_AL: 22.5977
OS_AL: 22.5531
OD_AL: 22.6874
OD_AL: 22.5977
OS_AL: 22.5531

## 2023-06-07 ASSESSMENT — CONFRONTATIONAL VISUAL FIELD TEST (CVF)
OS_FINDINGS: FULL
OD_FINDINGS: FULL

## 2023-06-07 ASSESSMENT — SUPERFICIAL PUNCTATE KERATITIS (SPK)
OS_SPK: 2+
OD_SPK: 2+

## 2023-06-28 ENCOUNTER — APPOINTMENT (OUTPATIENT)
Dept: RHEUMATOLOGY | Facility: CLINIC | Age: 53
End: 2023-06-28
Payer: COMMERCIAL

## 2023-06-28 ENCOUNTER — RESULT REVIEW (OUTPATIENT)
Age: 53
End: 2023-06-28

## 2023-06-28 VITALS
OXYGEN SATURATION: 98 % | SYSTOLIC BLOOD PRESSURE: 142 MMHG | BODY MASS INDEX: 23.9 KG/M2 | HEART RATE: 86 BPM | WEIGHT: 140 LBS | HEIGHT: 64 IN | TEMPERATURE: 98.1 F | DIASTOLIC BLOOD PRESSURE: 87 MMHG

## 2023-06-28 DIAGNOSIS — G89.29 CERVICALGIA: ICD-10-CM

## 2023-06-28 DIAGNOSIS — M54.2 CERVICALGIA: ICD-10-CM

## 2023-06-28 PROCEDURE — 99214 OFFICE O/P EST MOD 30 MIN: CPT | Mod: 25

## 2023-06-28 RX ORDER — GABAPENTIN 100 MG/1
100 CAPSULE ORAL
Qty: 360 | Refills: 2 | Status: COMPLETED | COMMUNITY
Start: 2022-04-27 | End: 2023-06-28

## 2023-06-28 RX ORDER — HYDROXYCHLOROQUINE SULFATE 200 MG/1
200 TABLET, FILM COATED ORAL TWICE DAILY
Qty: 180 | Refills: 1 | Status: COMPLETED | COMMUNITY
Start: 2023-02-28 | End: 2023-06-28

## 2023-06-28 NOTE — ASSESSMENT
[FreeTextEntry1] : 52 y/o female presents as follow up for possible SLE.  \par Pt started to have worsening pain the neck and arm x 1 year. Pt reports pains the R>L arm and b/l feet as well.  \par Denies joint swelling/redness. AM stiffness lasting ~2 mins. Pains worse with rest. Pt takes Tylenol and ibuprofen PRN for the pains with mild improvement but bothers her stomach. Numbness/tingling of hands. Pt was seen by orthopedics about 3 years and told that she had some arthritis of the neck.  \par Pt tried PT which did not help the patient.  \par No family Hx of autoimmune diseases.  \par \par Initial workup by me with positive JENNA and mild low WBC but negative JENNA subserologies. Patient without inflammatory joint pains, rashes, Raynaud's, or other signs of SLE. Very low vitamin D. XRs normal.\par Pt returns after negative EMG/NCV by neurology with especially worsened pain in b/l feet worst in R 4th and 5th toes. Repeat labwork by me with dsDNA positive. Given combination of JENNA, dsDNA, leukopenia, pt was started on HCQ by me on 2/2023 for possible SLE. However, HCQ was discontinue on 6/2023 due to lack of efficacy. Decreased suspiion for SLE at this time.\par \par Pt states that prednisone resolved the pains. 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 Pt has stopped gabapentin due to lack of efficacy.\par - Pt has more doses of PDN 20mg x 7 days for temporary symptomatic relief, to be used JUDICIOUSLY.\par - Will contact pt with MR C-spine results for any next steps. RTC 2 months for follow up \par

## 2023-06-28 NOTE — REASON FOR VISIT
[Follow-Up: _____] : a [unfilled] follow-up visit [[employer] ] : provided by [employer]   [Interpreters_IDNumber] : [id number] [Interpreters_FullName] : Hira Mack [TWNoteComboBox1] : Haitian

## 2023-06-28 NOTE — HISTORY OF PRESENT ILLNESS
[FreeTextEntry1] : HISTORY: \par 54 y/o female presents as follow up for possible SLE.  \par Pt started to have worsening pain the neck and arm x 1 year. Pt reports pains the R>L arm and b/l feet as well.  \par Denies joint swelling/redness. AM stiffness lasting ~2 mins. Pains worse with rest. Pt takes Tylenol and ibuprofen PRN for the pains with mild improvement but bothers her stomach. Numbness/tingling of hands. Pt was seen by orthopedics about 3 years and told that she had some arthritis of the neck.  \par Pt tried PT which did not help the patient.  \par No family Hx of autoimmune diseases.  \par \par Initial workup by me with positive JENNA and mild low WBC but negative JENNA subserologies. Patient without inflammatory joint pains, rashes, Raynaud's, or other signs of SLE. Very low vitamin D. XRs normal.\par Pt returns after negative EMG/NCV by neurology with especially worsened pain in b/l feet worst in R 4th and 5th toes. Repeat labwork by me with dsDNA positive. Given combination of JENNA, dsDNA, leukopenia, pt was started on HCQ by me on 2/2023 for possible SLE. \par \par INTERVAL HISTORY:\par Pt has been on HCQ since 2/2023 and reports no improvement in her symptoms at all. Pt also stopped gabapentin because she feels it numbs the pain rather than treating the pains.\par Pt states that PDN resolved her pains completely for 10 days while she was on PDN for 7 days.\par Pt has difficulty localizing the pains, but with further questioning states worst pains in b/l hands, elbows, feet.\par \par WORKUP:  \par Remarkable for (2/2022 - 2/2023): JENNA 1:160 speckled, dsDNA neg -> 97, ESR 24, WBC 3.35->3.33, , Vit D 10.2 -> 17.5  \par Normal/neg (2/2022 - 2/2023): CBC (except WBC), CMP, U/A, UPCR, CRP, SSA, SSB, French, RNP, C3, C4, Thyroid Ab, RF, CCP, aldolase, TSH  \par XR R shoulder (2/2022): Normal \par XR b/l feet (2/2022): Normal  \par XR C-spine (2/2022): Normal\par

## 2023-06-29 LAB
ALBUMIN SERPL ELPH-MCNC: 4.5 G/DL
ALP BLD-CCNC: 118 U/L
ALT SERPL-CCNC: 15 U/L
ANION GAP SERPL CALC-SCNC: 13 MMOL/L
AST SERPL-CCNC: 19 U/L
BILIRUB SERPL-MCNC: <0.2 MG/DL
BUN SERPL-MCNC: 14 MG/DL
C3 SERPL-MCNC: 104 MG/DL
C4 SERPL-MCNC: 18 MG/DL
CALCIUM SERPL-MCNC: 9.7 MG/DL
CHLORIDE SERPL-SCNC: 107 MMOL/L
CO2 SERPL-SCNC: 23 MMOL/L
CREAT SERPL-MCNC: 0.69 MG/DL
CRP SERPL-MCNC: <3 MG/L
DSDNA AB SER-ACNC: 111 IU/ML
EGFR: 104 ML/MIN/1.73M2
ERYTHROCYTE [SEDIMENTATION RATE] IN BLOOD BY WESTERGREN METHOD: 28 MM/HR
GLUCOSE SERPL-MCNC: 107 MG/DL
POTASSIUM SERPL-SCNC: 3.8 MMOL/L
PROT SERPL-MCNC: 6.9 G/DL
SODIUM SERPL-SCNC: 143 MMOL/L

## 2023-07-05 LAB — 14-3-3 ETA AG SER IA-MCNC: <0.2 NG/ML

## 2023-07-12 ENCOUNTER — OUTPATIENT (OUTPATIENT)
Dept: OUTPATIENT SERVICES | Facility: HOSPITAL | Age: 53
LOS: 1 days | End: 2023-07-12
Payer: COMMERCIAL

## 2023-07-12 ENCOUNTER — APPOINTMENT (OUTPATIENT)
Dept: MRI IMAGING | Facility: CLINIC | Age: 53
End: 2023-07-12
Payer: COMMERCIAL

## 2023-07-12 ENCOUNTER — APPOINTMENT (OUTPATIENT)
Dept: RADIOLOGY | Facility: CLINIC | Age: 53
End: 2023-07-12
Payer: COMMERCIAL

## 2023-07-12 DIAGNOSIS — M32.9 SYSTEMIC LUPUS ERYTHEMATOSUS, UNSPECIFIED: ICD-10-CM

## 2023-07-12 PROCEDURE — 72141 MRI NECK SPINE W/O DYE: CPT | Mod: 26

## 2023-07-12 PROCEDURE — 73070 X-RAY EXAM OF ELBOW: CPT | Mod: 26,LT,RT

## 2023-07-12 PROCEDURE — 73070 X-RAY EXAM OF ELBOW: CPT

## 2023-07-12 PROCEDURE — 73130 X-RAY EXAM OF HAND: CPT

## 2023-07-12 PROCEDURE — 73130 X-RAY EXAM OF HAND: CPT | Mod: 26,LT,RT

## 2023-07-12 PROCEDURE — 72141 MRI NECK SPINE W/O DYE: CPT

## 2023-07-12 PROCEDURE — 72100 X-RAY EXAM L-S SPINE 2/3 VWS: CPT | Mod: 26

## 2023-07-12 PROCEDURE — 72100 X-RAY EXAM L-S SPINE 2/3 VWS: CPT

## 2023-07-17 ENCOUNTER — NON-APPOINTMENT (OUTPATIENT)
Age: 53
End: 2023-07-17

## 2023-09-07 ENCOUNTER — OFFICE (OUTPATIENT)
Dept: URBAN - METROPOLITAN AREA CLINIC 94 | Facility: CLINIC | Age: 53
Setting detail: OPHTHALMOLOGY
End: 2023-09-07
Payer: COMMERCIAL

## 2023-09-07 DIAGNOSIS — H25.13: ICD-10-CM

## 2023-09-07 PROCEDURE — 92014 COMPRE OPH EXAM EST PT 1/>: CPT | Performed by: OPTOMETRIST

## 2023-09-07 ASSESSMENT — SPHEQUIV_DERIVED
OD_SPHEQUIV: 1.375
OD_SPHEQUIV: 1
OS_SPHEQUIV: 1.375
OS_SPHEQUIV: 1.25
OD_SPHEQUIV: 1.25
OS_SPHEQUIV: 1.5

## 2023-09-07 ASSESSMENT — REFRACTION_MANIFEST
OS_SPHERE: +1.50
OD_AXIS: 090
OS_VA1: 20/60
OS_VA1: 20/25
OD_SPHERE: +1.00
OS_VA2: 20/20(J1+)
OU_VA: 20/25
OS_CYLINDER: -0.50
OD_CYLINDER: -0.50
OD_ADD: +1.50
OS_CYLINDER: SPH
OD_SPHERE: +1.25
OD_CYLINDER: SPH
OD_VA2: 20/20(J1+)
OS_AXIS: 081
OU_VA: 20/20
OS_ADD: +1.75
OD_VA1: 20/60
OS_ADD: +1.50
OD_ADD: +1.75
OD_VA1: 20/25
OS_VA1: 20/25
OS_CYLINDER: -0.75
OS_SPHERE: +1.75
OD_SPHERE: +1.50
OS_SPHERE: +1.00
OD_CYLINDER: -0.50
OD_VA1: 20/20
OS_AXIS: 085
OD_AXIS: 079

## 2023-09-07 ASSESSMENT — REFRACTION_AUTOREFRACTION
OS_CYLINDER: -0.50
OD_CYLINDER: -0.25
OS_AXIS: 077
OS_SPHERE: +1.75
OD_AXIS: 088
OD_SPHERE: +1.50

## 2023-09-07 ASSESSMENT — REFRACTION_CURRENTRX
OD_OVR_VA: 20/
OS_OVR_VA: 20/
OD_SPHERE: +2.75
OS_OVR_VA: 20/
OS_CYLINDER: -0.50
OD_OVR_VA: 20/
OS_SPHERE: +3.00
OS_AXIS: 090
OD_AXIS: 086
OD_SPHERE: +1.50
OS_SPHERE: +1.50
OS_VPRISM_DIRECTION: SV
OS_VPRISM_DIRECTION: SV
OD_CYLINDER: -0.50
OD_VPRISM_DIRECTION: SV
OD_VPRISM_DIRECTION: SV

## 2023-09-07 ASSESSMENT — TONOMETRY
OS_IOP_MMHG: 15
OD_IOP_MMHG: 13

## 2023-09-07 ASSESSMENT — VISUAL ACUITY
OD_BCVA: 20/60
OS_BCVA: 20/50

## 2023-09-07 ASSESSMENT — SUPERFICIAL PUNCTATE KERATITIS (SPK)
OS_SPK: 2+
OD_SPK: 2+

## 2023-09-07 ASSESSMENT — AXIALLENGTH_DERIVED
OD_AL: 22.645
OD_AL: 22.5112
OS_AL: 22.5112
OD_AL: 22.5556
OS_AL: 22.4669
OS_AL: 22.5556

## 2023-09-07 ASSESSMENT — KERATOMETRY
OD_K1POWER_DIOPTERS: 44.50
OD_AXISANGLE_DEGREES: 080
OS_K1POWER_DIOPTERS: 45.00
OS_AXISANGLE_DEGREES: 099
OD_K2POWER_DIOPTERS: 45.75
OS_K2POWER_DIOPTERS: 45.25

## 2023-09-07 ASSESSMENT — CONFRONTATIONAL VISUAL FIELD TEST (CVF)
OS_FINDINGS: FULL
OD_FINDINGS: FULL

## 2023-09-19 ENCOUNTER — APPOINTMENT (OUTPATIENT)
Dept: RHEUMATOLOGY | Facility: CLINIC | Age: 53
End: 2023-09-19
Payer: COMMERCIAL

## 2023-09-19 VITALS
SYSTOLIC BLOOD PRESSURE: 114 MMHG | BODY MASS INDEX: 24.75 KG/M2 | WEIGHT: 145 LBS | TEMPERATURE: 97.1 F | HEIGHT: 64 IN | OXYGEN SATURATION: 100 % | HEART RATE: 86 BPM | DIASTOLIC BLOOD PRESSURE: 73 MMHG

## 2023-09-19 DIAGNOSIS — M79.2 NEURALGIA AND NEURITIS, UNSPECIFIED: ICD-10-CM

## 2023-09-19 DIAGNOSIS — R76.8 OTHER SPECIFIED ABNORMAL IMMUNOLOGICAL FINDINGS IN SERUM: ICD-10-CM

## 2023-09-19 PROCEDURE — 99214 OFFICE O/P EST MOD 30 MIN: CPT

## 2023-12-19 ENCOUNTER — APPOINTMENT (OUTPATIENT)
Dept: RHEUMATOLOGY | Facility: CLINIC | Age: 53
End: 2023-12-19
Payer: COMMERCIAL

## 2023-12-19 VITALS
OXYGEN SATURATION: 100 % | TEMPERATURE: 97.4 F | WEIGHT: 145 LBS | BODY MASS INDEX: 24.75 KG/M2 | HEIGHT: 64 IN | SYSTOLIC BLOOD PRESSURE: 148 MMHG | HEART RATE: 72 BPM | DIASTOLIC BLOOD PRESSURE: 82 MMHG

## 2023-12-19 DIAGNOSIS — M25.50 PAIN IN UNSPECIFIED JOINT: ICD-10-CM

## 2023-12-19 DIAGNOSIS — Z79.899 OTHER LONG TERM (CURRENT) DRUG THERAPY: ICD-10-CM

## 2023-12-19 DIAGNOSIS — M32.9 SYSTEMIC LUPUS ERYTHEMATOSUS, UNSPECIFIED: ICD-10-CM

## 2023-12-19 PROCEDURE — 99214 OFFICE O/P EST MOD 30 MIN: CPT

## 2023-12-19 RX ORDER — PREDNISONE 20 MG/1
20 TABLET ORAL
Qty: 30 | Refills: 0 | Status: ACTIVE | COMMUNITY
Start: 2023-04-26 | End: 1900-01-01

## 2023-12-19 RX ORDER — LEFLUNOMIDE 20 MG/1
20 TABLET, FILM COATED ORAL
Qty: 90 | Refills: 1 | Status: ACTIVE | COMMUNITY
Start: 2023-12-19 | End: 1900-01-01

## 2023-12-19 NOTE — ASSESSMENT
[FreeTextEntry1] : 54 y/o female presents as follow up for possible SLE.  Pt started to have worsening pain the neck and arm x 1 year. Pt reports pains the R>L arm and b/l feet as well.  Denies joint swelling/redness. AM stiffness lasting ~2 mins. Pains worse with rest. Pt takes Tylenol and ibuprofen PRN for the pains with mild improvement but bothers her stomach. Numbness/tingling of hands. Pt was seen by orthopedics about 3 years and told that she had some arthritis of the neck.  Pt tried PT which did not help the patient.  No family Hx of autoimmune diseases.   Initial workup by me with positive JENNA and mild low WBC but negative JENNA subserologies. Patient without inflammatory joint pains, rashes, Raynaud's, or other signs of SLE. Very low vitamin D. XRs normal.  Pt returns after negative EMG/NCV by neurology with especially worsened pain in b/l feet worst in R 4th and 5th toes. Repeat labwork by me with dsDNA positive. Given combination of JENNA, dsDNA, leukopenia, pt was started on HCQ by me on 2/2023 for possible SLE, but was discontinued on 6/2023 due to lack of efficacy.   XRs normal. MR C-spine without signs of compressive neuropathy  Pt has been using PDN bursts PRN for joint pain flares with resolution of pains with PDN. 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 Pt has been continuing to use PDN bursts which resolves the pain but pain comes back significantly off PDN. Discussed today about starting long term DMARD leflunomide for long term control and to minimize steroid exposure.  - Obtain labwork for medication safety - Rx LEF 20mg PO daily. Side effects of LEF were discussed with the patient in detail including but not limited to GI upset, HTN, hepatotoxicity, and cytopenias. This is a high-risk therapy requiring intense monitoring for toxicity. Will evaluate with frequent monitoring of BP, CBC, and LFTs. - c/w PDN 20mg x 7 days as needed for temporary symptomatic relief, to be used JUDICIOUSLY  - RTC 3 months for follow up.

## 2023-12-19 NOTE — PHYSICAL EXAM
[TextEntry] : GENERAL: Appears in no acute distress HEENT: EOMI, PERRLA. No conjunctival erythema. Moist mucous membranes. No nasopharyngeal ulcers NECK: Supple, no cervical lymphadenopathy, no thyromegaly CARDIOVASCULAR: RRR. S1, S2 auscultated. No murmurs or rubs. PULMONARY: Clear to auscultation b/l, no wheezes, rales, or crackles ABDOMINAL: Soft, nontender, nondistended. Bowel sounds present. No organomegaly. MSK: No active synovitis, swelling, erythema, or warmth. L 2nd PIP ~3mm soft, mobile nodule without tenderness to palpation No deformities. SKIN: No lesions or rashes NEURO: No focal deficits. PSYCH: AAOx3. Normal affect and thought process.

## 2023-12-19 NOTE — HISTORY OF PRESENT ILLNESS
[FreeTextEntry1] : HISTORY:  54 y/o female presents as follow up for possible SLE.  Pt started to have worsening pain the neck and arm x 1 year. Pt reports pains the R>L arm and b/l feet as well.  Denies joint swelling/redness. AM stiffness lasting ~2 mins. Pains worse with rest. Pt takes Tylenol and ibuprofen PRN for the pains with mild improvement but bothers her stomach. Numbness/tingling of hands. Pt was seen by orthopedics about 3 years and told that she had some arthritis of the neck.  Pt tried PT which did not help the patient.  No family Hx of autoimmune diseases.   Initial workup by me with positive JENNA and mild low WBC but negative JENNA subserologies. Patient without inflammatory joint pains, rashes, Raynaud's, or other signs of SLE. Very low vitamin D. XRs normal.  Pt returns after negative EMG/NCV by neurology with especially worsened pain in b/l feet worst in R 4th and 5th toes. Repeat labwork by me with dsDNA positive. Given combination of JENNA, dsDNA, leukopenia, pt was started on HCQ by me on 2/2023 for possible SLE, but was discontinued on 6/2023 due to lack of efficacy.   XRs normal. MR C-spine without signs of compressive neuropathy  Pt has been using PDN bursts PRN for joint pain flares with resolution of pains with PDN. 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   INTERVAL HISTORY:  Repeat labwork by me last visit with even higher dsDNA. Pt has been continuing to use PDN bursts which resolves the pain but pain comes back significantly off PDN. Discussed today about starting long term DMARD leflunomide for long term control and to minimize steroid exposure.  WORKUP:  Remarkable for (2/2022 - 2/2023): JENNA 1:160 speckled, dsDNA neg -> 97, ESR 24, WBC 3.35->3.33, , Vit D 10.2 -> 17.5  Normal/neg (2/2022 - 2/2023): CBC (except WBC), CMP, U/A, UPCR, CRP, SSA, SSB, French, RNP, C3, C4, Thyroid Ab, RF, CCP, aldolase, TSH  XR b/l hands (7/2023): Normal  XR b/l elbows (7/2023): Normal  XR R shoulder (2/2022): Normal  XR b/l feet (2/2022): Normal  XR C-spine (2/2022): Normal  XR L-spine (7/2023): Normal  MR C-spine (7/2023): Normal without DDD, DJD, or nerve compression

## 2023-12-19 NOTE — REASON FOR VISIT
[Follow-Up: _____] : a [unfilled] follow-up visit [Interpreters_IDNumber] : [id number] [Interpreters_FullName] : Kiara [TWNoteComboBox1] : Finnish

## 2023-12-20 LAB
ALBUMIN SERPL ELPH-MCNC: 4.2 G/DL
ALP BLD-CCNC: 111 U/L
ALT SERPL-CCNC: 13 U/L
ANION GAP SERPL CALC-SCNC: 11 MMOL/L
AST SERPL-CCNC: 18 U/L
BILIRUB SERPL-MCNC: 0.2 MG/DL
BUN SERPL-MCNC: 16 MG/DL
CALCIUM SERPL-MCNC: 9.4 MG/DL
CHLORIDE SERPL-SCNC: 108 MMOL/L
CO2 SERPL-SCNC: 27 MMOL/L
CREAT SERPL-MCNC: 0.78 MG/DL
CRP SERPL-MCNC: <3 MG/L
EGFR: 91 ML/MIN/1.73M2
ERYTHROCYTE [SEDIMENTATION RATE] IN BLOOD BY WESTERGREN METHOD: 12 MM/HR
GLUCOSE SERPL-MCNC: 90 MG/DL
HBV CORE IGG+IGM SER QL: NONREACTIVE
HBV SURFACE AB SER QL: NONREACTIVE
HBV SURFACE AG SER QL: NONREACTIVE
HCT VFR BLD CALC: 39.1 %
HCV AB SER QL: NONREACTIVE
HCV S/CO RATIO: 0.2 S/CO
HGB BLD-MCNC: 12.5 G/DL
MCHC RBC-ENTMCNC: 29.3 PG
MCHC RBC-ENTMCNC: 32 GM/DL
MCV RBC AUTO: 91.6 FL
PLATELET # BLD AUTO: 225 K/UL
POTASSIUM SERPL-SCNC: 4.1 MMOL/L
PROT SERPL-MCNC: 6.9 G/DL
RBC # BLD: 4.27 M/UL
RBC # FLD: 13.5 %
SODIUM SERPL-SCNC: 145 MMOL/L
WBC # FLD AUTO: 3.56 K/UL

## 2023-12-24 LAB
M TB IFN-G BLD-IMP: NEGATIVE
QUANTIFERON TB PLUS MITOGEN MINUS NIL: 5.68 IU/ML
QUANTIFERON TB PLUS NIL: 0.02 IU/ML
QUANTIFERON TB PLUS TB1 MINUS NIL: 0 IU/ML
QUANTIFERON TB PLUS TB2 MINUS NIL: -0.01 IU/ML

## 2024-03-18 ENCOUNTER — APPOINTMENT (OUTPATIENT)
Dept: RHEUMATOLOGY | Facility: CLINIC | Age: 54
End: 2024-03-18

## 2025-02-28 PROBLEM — H10.433 ALLERGIC CONJUNCTIVITIS; BOTH EYES: Status: ACTIVE | Noted: 2025-02-28

## 2025-03-14 ENCOUNTER — OFFICE (OUTPATIENT)
Dept: URBAN - METROPOLITAN AREA CLINIC 94 | Facility: CLINIC | Age: 55
Setting detail: OPHTHALMOLOGY
End: 2025-03-14
Payer: COMMERCIAL

## 2025-03-14 DIAGNOSIS — H16.223: ICD-10-CM

## 2025-03-14 DIAGNOSIS — H11.042: ICD-10-CM

## 2025-03-14 DIAGNOSIS — H04.123: ICD-10-CM

## 2025-03-14 PROCEDURE — 99214 OFFICE O/P EST MOD 30 MIN: CPT | Performed by: OPHTHALMOLOGY

## 2025-03-14 PROCEDURE — 92285 EXTERNAL OCULAR PHOTOGRAPHY: CPT | Performed by: OPHTHALMOLOGY

## 2025-03-14 ASSESSMENT — REFRACTION_MANIFEST
OS_SPHERE: +1.75
OD_ADD: +1.50
OS_SPHERE: +1.00
OD_ADD: +1.75
OD_VA2: 20/20(J1+)
OD_SPHERE: +1.00
OS_VA1: 20/25
OD_SPHERE: +1.50
OS_ADD: +1.75
OD_SPHERE: +1.25
OS_SPHERE: +1.50
OS_ADD: +1.50
OU_VA: 20/25
OS_SPHERE: +1.25
OS_VA1: 20/60
OS_ADD: +1.50
OS_CYLINDER: -0.75
OD_VA1: 20/20
OD_CYLINDER: SPH
OU_VA: 20/20
OS_CYLINDER: SPH
OS_AXIS: 085
OS_AXIS: 081
OS_VA1: 20/25
OD_AXIS: 090
OD_CYLINDER: -0.50
OD_SPHERE: +1.00
OD_VA1: 20/25
OD_AXIS: 079
OS_VA1: 20/25
OD_VA1: 20/60
OS_CYLINDER: -0.50
OD_CYLINDER: -0.50
OS_VA2: 20/20(J1+)
OD_VA1: 20/25
OD_ADD: +1.50
OS_CYLINDER: SPH
OD_CYLINDER: SPH

## 2025-03-14 ASSESSMENT — KERATOMETRY
OS_AXISANGLE_DEGREES: 095
OS_K1POWER_DIOPTERS: 44.75
OD_AXISANGLE_DEGREES: 079
OD_K2POWER_DIOPTERS: 45.50
OD_K1POWER_DIOPTERS: 44.50
OS_K2POWER_DIOPTERS: 45.00

## 2025-03-14 ASSESSMENT — REFRACTION_AUTOREFRACTION
OD_CYLINDER: -0.25
OD_AXIS: 087
OD_SPHERE: +1.50
OS_SPHERE: +2.00
OS_AXIS: 076
OS_CYLINDER: -0.50

## 2025-03-14 ASSESSMENT — VISUAL ACUITY
OS_BCVA: 20/50
OD_BCVA: 20/60

## 2025-03-14 ASSESSMENT — REFRACTION_CURRENTRX
OD_SPHERE: +1.50
OS_CYLINDER: -0.50
OD_AXIS: 086
OD_CYLINDER: -0.50
OD_OVR_VA: 20/
OS_SPHERE: +3.00
OS_VPRISM_DIRECTION: SV
OS_OVR_VA: 20/
OD_SPHERE: +2.75
OD_OVR_VA: 20/
OS_SPHERE: +1.50
OS_OVR_VA: 20/
OS_VPRISM_DIRECTION: SV
OD_VPRISM_DIRECTION: SV
OS_AXIS: 090
OD_VPRISM_DIRECTION: SV

## 2025-03-14 ASSESSMENT — CONFRONTATIONAL VISUAL FIELD TEST (CVF)
OD_FINDINGS: FULL
OS_FINDINGS: FULL

## 2025-03-14 ASSESSMENT — SUPERFICIAL PUNCTATE KERATITIS (SPK)
OD_SPK: 2+
OS_SPK: 2+

## 2025-03-14 ASSESSMENT — CORNEAL PTERYGIUM: OS_PTERYGIUM: NASAL 3MM

## 2025-05-19 ENCOUNTER — ASC (OUTPATIENT)
Dept: URBAN - METROPOLITAN AREA SURGERY 8 | Facility: SURGERY | Age: 55
Setting detail: OPHTHALMOLOGY
End: 2025-05-19
Payer: COMMERCIAL

## 2025-05-19 DIAGNOSIS — H11.042: ICD-10-CM

## 2025-05-19 PROCEDURE — 65426 REMOVAL OF EYE LESION: CPT | Mod: LT | Performed by: OPHTHALMOLOGY

## 2025-05-20 ENCOUNTER — RX ONLY (RX ONLY)
Age: 55
End: 2025-05-20

## 2025-05-20 ENCOUNTER — OFFICE (OUTPATIENT)
Dept: URBAN - METROPOLITAN AREA CLINIC 94 | Facility: CLINIC | Age: 55
Setting detail: OPHTHALMOLOGY
End: 2025-05-20
Payer: COMMERCIAL

## 2025-05-20 DIAGNOSIS — H11.042: ICD-10-CM

## 2025-05-20 PROCEDURE — 99024 POSTOP FOLLOW-UP VISIT: CPT | Performed by: OPTOMETRIST

## 2025-05-20 ASSESSMENT — REFRACTION_MANIFEST
OS_ADD: +1.50
OS_CYLINDER: SPH
OD_VA1: 20/20
OS_VA1: 20/25
OS_AXIS: 081
OS_VA1: 20/60
OS_SPHERE: +1.75
OD_ADD: +1.50
OD_VA2: 20/20(J1+)
OD_CYLINDER: SPH
OD_VA1: 20/25
OD_VA1: 20/60
OS_CYLINDER: SPH
OU_VA: 20/25
OD_AXIS: 090
OS_VA1: 20/25
OS_VA2: 20/20(J1+)
OD_CYLINDER: -0.50
OD_SPHERE: +1.50
OS_VA1: 20/25
OS_CYLINDER: -0.75
OU_VA: 20/20
OS_ADD: +1.75
OD_SPHERE: +1.00
OD_CYLINDER: SPH
OD_VA1: 20/25
OD_CYLINDER: -0.50
OD_SPHERE: +1.00
OD_SPHERE: +1.25
OS_SPHERE: +1.50
OS_SPHERE: +1.25
OS_AXIS: 085
OD_ADD: +1.50
OS_SPHERE: +1.00
OD_ADD: +1.75
OD_AXIS: 079
OS_ADD: +1.50
OS_CYLINDER: -0.50

## 2025-05-20 ASSESSMENT — KERATOMETRY
OS_AXISANGLE_DEGREES: 095
OD_K2POWER_DIOPTERS: 45.50
OD_K1POWER_DIOPTERS: 44.50
OD_AXISANGLE_DEGREES: 079
OS_K1POWER_DIOPTERS: 44.75
OS_K2POWER_DIOPTERS: 45.00

## 2025-05-20 ASSESSMENT — REFRACTION_AUTOREFRACTION
OD_SPHERE: +1.50
OS_AXIS: 076
OS_CYLINDER: -0.50
OD_AXIS: 087
OD_CYLINDER: -0.25
OS_SPHERE: +2.00

## 2025-05-20 ASSESSMENT — CORNEAL PTERYGIUM: OS_PTERYGIUM: NASAL 3MM

## 2025-05-20 ASSESSMENT — SUPERFICIAL PUNCTATE KERATITIS (SPK)
OS_SPK: 2+
OD_SPK: 2+

## 2025-05-20 ASSESSMENT — REFRACTION_CURRENTRX
OS_AXIS: 090
OS_OVR_VA: 20/
OD_SPHERE: +1.50
OS_VPRISM_DIRECTION: SV
OS_CYLINDER: -0.50
OS_SPHERE: +1.50
OS_SPHERE: +3.00
OS_VPRISM_DIRECTION: SV
OD_OVR_VA: 20/
OD_OVR_VA: 20/
OD_AXIS: 086
OS_OVR_VA: 20/
OD_VPRISM_DIRECTION: SV
OD_SPHERE: +2.75
OD_VPRISM_DIRECTION: SV
OD_CYLINDER: -0.50

## 2025-05-20 ASSESSMENT — VISUAL ACUITY
OD_BCVA: 20/40
OS_BCVA: 20/40

## 2025-06-02 ENCOUNTER — OFFICE (OUTPATIENT)
Dept: URBAN - METROPOLITAN AREA CLINIC 94 | Facility: CLINIC | Age: 55
Setting detail: OPHTHALMOLOGY
End: 2025-06-02
Payer: COMMERCIAL

## 2025-06-02 DIAGNOSIS — H11.042: ICD-10-CM

## 2025-06-02 PROCEDURE — 99024 POSTOP FOLLOW-UP VISIT: CPT | Performed by: PHYSICIAN ASSISTANT

## 2025-06-02 ASSESSMENT — REFRACTION_MANIFEST
OS_VA1: 20/60
OD_CYLINDER: SPH
OS_SPHERE: +1.00
OD_ADD: +1.75
OS_VA1: 20/25
OD_SPHERE: +1.00
OS_CYLINDER: -0.75
OS_AXIS: 081
OD_CYLINDER: -0.50
OD_SPHERE: +1.50
OD_SPHERE: +1.25
OS_VA1: 20/25
OS_CYLINDER: -0.50
OD_CYLINDER: -0.50
OS_VA1: 20/25
OD_VA1: 20/25
OS_SPHERE: +1.75
OU_VA: 20/25
OD_VA1: 20/25
OD_ADD: +1.50
OS_CYLINDER: SPH
OS_AXIS: 085
OS_SPHERE: +1.25
OU_VA: 20/20
OD_SPHERE: +1.00
OD_VA1: 20/60
OD_ADD: +1.50
OD_AXIS: 079
OS_CYLINDER: SPH
OS_ADD: +1.50
OS_VA2: 20/20(J1+)
OS_SPHERE: +1.50
OD_CYLINDER: SPH
OD_VA1: 20/20
OD_VA2: 20/20(J1+)
OS_ADD: +1.50
OS_ADD: +1.75
OD_AXIS: 090

## 2025-06-02 ASSESSMENT — REFRACTION_CURRENTRX
OD_SPHERE: +1.50
OD_AXIS: 086
OD_VPRISM_DIRECTION: SV
OS_OVR_VA: 20/
OS_CYLINDER: -0.50
OS_OVR_VA: 20/
OD_VPRISM_DIRECTION: SV
OS_AXIS: 090
OS_SPHERE: +3.00
OD_CYLINDER: -0.50
OD_SPHERE: +2.75
OD_OVR_VA: 20/
OS_VPRISM_DIRECTION: SV
OS_VPRISM_DIRECTION: SV
OD_OVR_VA: 20/
OS_SPHERE: +1.50

## 2025-06-02 ASSESSMENT — TONOMETRY
OS_IOP_MMHG: 16
OD_IOP_MMHG: 14

## 2025-06-02 ASSESSMENT — SUPERFICIAL PUNCTATE KERATITIS (SPK)
OS_SPK: 2+
OD_SPK: 2+

## 2025-06-02 ASSESSMENT — KERATOMETRY
OS_K2POWER_DIOPTERS: 45.50
OD_K2POWER_DIOPTERS: 45.50
OD_K1POWER_DIOPTERS: 44.75
OS_K1POWER_DIOPTERS: 45.00
OS_AXISANGLE_DEGREES: 092
OD_AXISANGLE_DEGREES: 078

## 2025-06-02 ASSESSMENT — REFRACTION_AUTOREFRACTION
OS_SPHERE: +1.00
OD_CYLINDER: -0.50
OD_SPHERE: +1.50
OS_AXIS: 091
OS_CYLINDER: -0.25
OD_AXIS: 084

## 2025-06-02 ASSESSMENT — CONFRONTATIONAL VISUAL FIELD TEST (CVF)
OD_FINDINGS: FULL
OS_FINDINGS: FULL

## 2025-06-02 ASSESSMENT — VISUAL ACUITY
OD_BCVA: 20/40
OS_BCVA: 20/50

## 2025-06-24 ENCOUNTER — OFFICE (OUTPATIENT)
Dept: URBAN - METROPOLITAN AREA CLINIC 112 | Facility: CLINIC | Age: 55
Setting detail: OPHTHALMOLOGY
End: 2025-06-24
Payer: COMMERCIAL

## 2025-06-24 DIAGNOSIS — H11.042: ICD-10-CM

## 2025-06-24 PROCEDURE — 99024 POSTOP FOLLOW-UP VISIT: CPT | Performed by: OPHTHALMOLOGY

## 2025-06-24 ASSESSMENT — KERATOMETRY
OS_K2POWER_DIOPTERS: 45.50
OD_K1POWER_DIOPTERS: 44.75
OS_K1POWER_DIOPTERS: 45.00
OS_AXISANGLE_DEGREES: 092
OD_K2POWER_DIOPTERS: 45.50
OD_AXISANGLE_DEGREES: 078

## 2025-06-24 ASSESSMENT — REFRACTION_MANIFEST
OD_VA1: 20/20
OS_SPHERE: +1.50
OD_CYLINDER: -0.50
OD_AXIS: 079
OD_ADD: +1.50
OD_VA1: 20/60
OD_ADD: +1.50
OD_VA2: 20/20(J1+)
OD_SPHERE: +1.25
OS_VA2: 20/20(J1+)
OD_SPHERE: +1.00
OS_ADD: +1.50
OS_CYLINDER: SPH
OS_VA1: 20/25
OS_CYLINDER: SPH
OS_VA1: 20/25
OD_VA1: 20/25
OS_AXIS: 085
OD_SPHERE: +1.00
OD_ADD: +1.75
OS_ADD: +1.50
OD_AXIS: 090
OD_CYLINDER: SPH
OS_VA1: 20/60
OS_AXIS: 081
OD_SPHERE: +1.50
OD_CYLINDER: SPH
OS_VA1: 20/25
OU_VA: 20/25
OS_CYLINDER: -0.75
OS_ADD: +1.75
OD_CYLINDER: -0.50
OD_VA1: 20/25
OS_CYLINDER: -0.50
OS_SPHERE: +1.25
OU_VA: 20/20
OS_SPHERE: +1.75
OS_SPHERE: +1.00

## 2025-06-24 ASSESSMENT — REFRACTION_AUTOREFRACTION
OS_CYLINDER: -0.25
OD_SPHERE: +1.50
OD_AXIS: 084
OS_SPHERE: +1.00
OD_CYLINDER: -0.50
OS_AXIS: 091

## 2025-06-24 ASSESSMENT — CONFRONTATIONAL VISUAL FIELD TEST (CVF)
OS_FINDINGS: FULL
OD_FINDINGS: FULL

## 2025-06-24 ASSESSMENT — VISUAL ACUITY
OD_BCVA: 20/40
OS_BCVA: 20/50

## 2025-06-24 ASSESSMENT — REFRACTION_CURRENTRX
OD_OVR_VA: 20/
OD_SPHERE: +1.50
OD_OVR_VA: 20/
OS_OVR_VA: 20/
OD_AXIS: 086
OD_VPRISM_DIRECTION: SV
OD_VPRISM_DIRECTION: SV
OD_SPHERE: +2.75
OS_VPRISM_DIRECTION: SV
OS_SPHERE: +3.00
OD_CYLINDER: -0.50
OS_CYLINDER: -0.50
OS_SPHERE: +1.50
OS_OVR_VA: 20/
OS_AXIS: 090
OS_VPRISM_DIRECTION: SV

## 2025-06-24 ASSESSMENT — SUPERFICIAL PUNCTATE KERATITIS (SPK)
OD_SPK: 2+
OS_SPK: 2+

## 2025-06-24 ASSESSMENT — TONOMETRY: OS_IOP_MMHG: 15

## 2025-06-30 ENCOUNTER — OFFICE (OUTPATIENT)
Dept: URBAN - METROPOLITAN AREA CLINIC 103 | Facility: CLINIC | Age: 55
Setting detail: OPHTHALMOLOGY
End: 2025-06-30
Payer: COMMERCIAL

## 2025-06-30 DIAGNOSIS — H25.13: ICD-10-CM

## 2025-06-30 DIAGNOSIS — H16.223: ICD-10-CM

## 2025-06-30 DIAGNOSIS — H40.033: ICD-10-CM

## 2025-06-30 PROCEDURE — 99213 OFFICE O/P EST LOW 20 MIN: CPT | Performed by: OPHTHALMOLOGY

## 2025-06-30 PROCEDURE — 92250 FUNDUS PHOTOGRAPHY W/I&R: CPT | Performed by: OPHTHALMOLOGY

## 2025-06-30 PROCEDURE — 92020 GONIOSCOPY: CPT | Performed by: OPHTHALMOLOGY

## 2025-06-30 ASSESSMENT — REFRACTION_MANIFEST
OS_CYLINDER: -0.50
OD_ADD: +1.50
OD_VA1: 20/25
OD_CYLINDER: SPH
OD_CYLINDER: SPH
OD_VA1: 20/25
OS_ADD: +1.50
OS_VA2: 20/20(J1+)
OU_VA: 20/20
OD_VA1: 20/20
OS_AXIS: 085
OS_SPHERE: +1.00
OD_AXIS: 090
OD_SPHERE: +1.00
OS_VA1: 20/25
OS_VA1: 20/25
OS_SPHERE: +1.75
OD_SPHERE: +1.00
OD_SPHERE: +1.50
OU_VA: 20/25
OS_SPHERE: +1.25
OS_SPHERE: +1.50
OD_VA1: 20/60
OD_SPHERE: +1.25
OS_CYLINDER: SPH
OS_VA1: 20/25
OS_ADD: +1.75
OS_CYLINDER: SPH
OD_ADD: +1.50
OD_VA2: 20/20(J1+)
OD_CYLINDER: -0.50
OS_ADD: +1.50
OD_AXIS: 079
OS_CYLINDER: -0.75
OD_CYLINDER: -0.50
OD_ADD: +1.75
OS_VA1: 20/60
OS_AXIS: 081

## 2025-06-30 ASSESSMENT — REFRACTION_CURRENTRX
OS_OVR_VA: 20/
OD_OVR_VA: 20/
OD_SPHERE: +2.75
OD_OVR_VA: 20/
OS_CYLINDER: -0.50
OS_AXIS: 090
OS_OVR_VA: 20/
OS_VPRISM_DIRECTION: SV
OD_VPRISM_DIRECTION: SV
OD_CYLINDER: SPH
OS_VPRISM_DIRECTION: SV
OS_AXIS: 111
OD_CYLINDER: -0.50
OS_CYLINDER: -0.25
OD_SPHERE: +1.00
OD_AXIS: 086
OS_SPHERE: +1.50
OD_VPRISM_DIRECTION: SV
OS_SPHERE: +3.00
OS_ADD: +1.50
OD_ADD: +1.50

## 2025-06-30 ASSESSMENT — CONFRONTATIONAL VISUAL FIELD TEST (CVF)
OS_FINDINGS: FULL
OD_FINDINGS: FULL

## 2025-06-30 ASSESSMENT — VISUAL ACUITY
OS_BCVA: 20/30
OD_BCVA: 20/40

## 2025-06-30 ASSESSMENT — TONOMETRY
OS_IOP_MMHG: 14
OS_IOP_MMHG: 16
OD_IOP_MMHG: 14
OD_IOP_MMHG: 16

## 2025-08-12 ENCOUNTER — RX ONLY (RX ONLY)
Age: 55
End: 2025-08-12

## 2025-08-12 ENCOUNTER — OFFICE (OUTPATIENT)
Dept: URBAN - METROPOLITAN AREA CLINIC 112 | Facility: CLINIC | Age: 55
Setting detail: OPHTHALMOLOGY
End: 2025-08-12
Payer: COMMERCIAL

## 2025-08-12 DIAGNOSIS — H11.042: ICD-10-CM

## 2025-08-12 PROCEDURE — 99024 POSTOP FOLLOW-UP VISIT: CPT | Performed by: OPHTHALMOLOGY

## 2025-08-12 ASSESSMENT — REFRACTION_CURRENTRX
OD_ADD: +1.50
OS_OVR_VA: 20/
OD_VPRISM_DIRECTION: SV
OS_AXIS: 111
OD_SPHERE: +2.75
OS_ADD: +1.50
OS_VPRISM_DIRECTION: SV
OD_OVR_VA: 20/
OD_CYLINDER: -0.50
OD_SPHERE: +1.00
OS_VPRISM_DIRECTION: SV
OD_CYLINDER: SPH
OS_CYLINDER: -0.50
OS_OVR_VA: 20/
OS_SPHERE: +1.50
OS_AXIS: 090
OD_VPRISM_DIRECTION: SV
OD_AXIS: 086
OS_SPHERE: +3.00
OD_OVR_VA: 20/
OS_CYLINDER: -0.25

## 2025-08-12 ASSESSMENT — REFRACTION_MANIFEST
OS_VA2: 20/20(J1+)
OD_ADD: +1.50
OS_ADD: +1.75
OD_ADD: +1.50
OD_CYLINDER: -0.50
OD_VA1: 20/20
OS_VA1: 20/25
OD_SPHERE: +1.50
OS_AXIS: 085
OS_SPHERE: +1.00
OD_AXIS: 079
OS_CYLINDER: SPH
OS_SPHERE: +1.25
OD_ADD: +1.75
OU_VA: 20/20
OD_VA1: 20/25
OD_SPHERE: +1.00
OD_CYLINDER: -0.50
OS_VA1: 20/25
OS_SPHERE: +1.50
OU_VA: 20/25
OD_CYLINDER: SPH
OS_CYLINDER: -0.50
OS_VA1: 20/25
OD_SPHERE: +1.25
OS_AXIS: 081
OD_CYLINDER: SPH
OS_CYLINDER: SPH
OD_VA2: 20/20(J1+)
OS_CYLINDER: -0.75
OD_VA1: 20/60
OD_SPHERE: +1.00
OS_ADD: +1.50
OS_SPHERE: +1.75
OD_AXIS: 090
OD_VA1: 20/25
OS_ADD: +1.50
OS_VA1: 20/60

## 2025-08-12 ASSESSMENT — SUPERFICIAL PUNCTATE KERATITIS (SPK)
OS_SPK: 2+
OD_SPK: 2+

## 2025-08-12 ASSESSMENT — REFRACTION_AUTOREFRACTION
OS_SPHERE: +1.25
OS_AXIS: 081
OS_CYLINDER: -0.75
OD_CYLINDER: -0.75
OD_AXIS: 072
OD_SPHERE: +1.50

## 2025-08-12 ASSESSMENT — KERATOMETRY
OS_K2POWER_DIOPTERS: 45.50
OD_AXISANGLE_DEGREES: 077
OD_K1POWER_DIOPTERS: 44.50
OD_K2POWER_DIOPTERS: 45.50
OS_AXISANGLE_DEGREES: 099
OS_K1POWER_DIOPTERS: 45.00

## 2025-08-12 ASSESSMENT — CONFRONTATIONAL VISUAL FIELD TEST (CVF)
OS_FINDINGS: FULL
OD_FINDINGS: FULL

## 2025-08-12 ASSESSMENT — VISUAL ACUITY
OS_BCVA: 20/60
OD_BCVA: 20/60

## 2025-08-26 ENCOUNTER — OFFICE (OUTPATIENT)
Dept: URBAN - METROPOLITAN AREA CLINIC 112 | Facility: CLINIC | Age: 55
Setting detail: OPHTHALMOLOGY
End: 2025-08-26
Payer: COMMERCIAL

## 2025-08-26 DIAGNOSIS — H11.042: ICD-10-CM

## 2025-08-26 DIAGNOSIS — H16.223: ICD-10-CM

## 2025-08-26 PROCEDURE — 99212 OFFICE O/P EST SF 10 MIN: CPT | Performed by: OPHTHALMOLOGY

## 2025-08-26 ASSESSMENT — REFRACTION_CURRENTRX
OD_CYLINDER: SPH
OD_SPHERE: +2.75
OS_AXIS: 090
OS_OVR_VA: 20/
OS_SPHERE: +3.00
OD_ADD: +1.50
OD_VPRISM_DIRECTION: SV
OS_CYLINDER: -0.50
OS_SPHERE: +1.50
OD_OVR_VA: 20/
OD_AXIS: 086
OS_CYLINDER: -0.25
OS_VPRISM_DIRECTION: SV
OD_SPHERE: +1.00
OS_AXIS: 111
OD_OVR_VA: 20/
OS_VPRISM_DIRECTION: SV
OD_VPRISM_DIRECTION: SV
OS_ADD: +1.50
OS_OVR_VA: 20/
OD_CYLINDER: -0.50

## 2025-08-26 ASSESSMENT — KERATOMETRY
OD_K2POWER_DIOPTERS: 45.50
OS_K1POWER_DIOPTERS: 45.00
OD_K1POWER_DIOPTERS: 44.50
OD_AXISANGLE_DEGREES: 077
OS_AXISANGLE_DEGREES: 099
OS_K2POWER_DIOPTERS: 45.50

## 2025-08-26 ASSESSMENT — REFRACTION_AUTOREFRACTION
OS_CYLINDER: -0.75
OD_SPHERE: +1.50
OD_CYLINDER: -0.75
OD_AXIS: 072
OS_AXIS: 081
OS_SPHERE: +1.25

## 2025-08-26 ASSESSMENT — REFRACTION_MANIFEST
OS_ADD: +1.50
OD_ADD: +1.50
OD_SPHERE: +1.25
OS_CYLINDER: -0.50
OD_AXIS: 079
OS_VA2: 20/20(J1+)
OS_SPHERE: +1.50
OD_CYLINDER: SPH
OD_VA2: 20/20(J1+)
OS_VA1: 20/60
OS_CYLINDER: -0.75
OU_VA: 20/25
OD_VA1: 20/20
OD_VA1: 20/60
OS_ADD: +1.75
OD_CYLINDER: SPH
OD_SPHERE: +1.00
OS_VA1: 20/25
OS_VA1: 20/25
OS_CYLINDER: SPH
OD_VA1: 20/25
OS_VA1: 20/25
OS_SPHERE: +1.25
OU_VA: 20/20
OD_CYLINDER: -0.50
OD_SPHERE: +1.50
OD_ADD: +1.50
OD_CYLINDER: -0.50
OD_ADD: +1.75
OD_VA1: 20/25
OS_ADD: +1.50
OS_AXIS: 081
OS_SPHERE: +1.00
OS_AXIS: 085
OS_SPHERE: +1.75
OD_SPHERE: +1.00
OD_AXIS: 090
OS_CYLINDER: SPH

## 2025-08-26 ASSESSMENT — CONFRONTATIONAL VISUAL FIELD TEST (CVF)
OD_FINDINGS: FULL
OS_FINDINGS: FULL

## 2025-08-26 ASSESSMENT — VISUAL ACUITY
OS_BCVA: 20/40-1
OD_BCVA: 20/50

## 2025-08-26 ASSESSMENT — SUPERFICIAL PUNCTATE KERATITIS (SPK)
OD_SPK: 2+
OS_SPK: 2+

## 2025-08-26 ASSESSMENT — TONOMETRY
OD_IOP_MMHG: 14
OS_IOP_MMHG: 12